# Patient Record
Sex: FEMALE | Race: WHITE | Employment: FULL TIME | URBAN - METROPOLITAN AREA
[De-identification: names, ages, dates, MRNs, and addresses within clinical notes are randomized per-mention and may not be internally consistent; named-entity substitution may affect disease eponyms.]

---

## 2017-03-27 ENCOUNTER — OFFICE VISIT (OUTPATIENT)
Dept: OBGYN CLINIC | Age: 37
End: 2017-03-27
Payer: COMMERCIAL

## 2017-03-27 ENCOUNTER — HOSPITAL ENCOUNTER (OUTPATIENT)
Age: 37
Setting detail: SPECIMEN
Discharge: HOME | End: 2017-03-27

## 2017-03-27 VITALS
WEIGHT: 116 LBS | BODY MASS INDEX: 19.81 KG/M2 | DIASTOLIC BLOOD PRESSURE: 62 MMHG | HEIGHT: 64 IN | SYSTOLIC BLOOD PRESSURE: 108 MMHG

## 2017-03-27 DIAGNOSIS — Z12.31 VISIT FOR SCREENING MAMMOGRAM: ICD-10-CM

## 2017-03-27 DIAGNOSIS — Z01.419 PAPANICOLAOU SMEAR, AS PART OF ROUTINE GYNECOLOGICAL EXAMINATION: Primary | ICD-10-CM

## 2017-03-27 PROCEDURE — 99395 PREV VISIT EST AGE 18-39: CPT | Performed by: NURSE PRACTITIONER

## 2017-03-27 RX ORDER — AZITHROMYCIN 250 MG/1
TABLET, FILM COATED ORAL
COMMUNITY
Start: 2016-12-29 | End: 2017-03-27 | Stop reason: ALTCHOICE

## 2017-03-27 RX ORDER — SPIRONOLACTONE 50 MG/1
TABLET, FILM COATED ORAL
COMMUNITY
Start: 2017-01-18 | End: 2017-03-27 | Stop reason: DRUGHIGH

## 2017-03-27 ASSESSMENT — ENCOUNTER SYMPTOMS
DIARRHEA: 0
WHEEZING: 0
COLOR CHANGE: 0
CONSTIPATION: 0
PHOTOPHOBIA: 0
BLOOD IN STOOL: 0
COUGH: 0
VOMITING: 0
SHORTNESS OF BREATH: 0
ABDOMINAL DISTENTION: 0
BACK PAIN: 0
NAUSEA: 0
CHEST TIGHTNESS: 0
ABDOMINAL PAIN: 0

## 2017-03-28 LAB
HPV SAMPLE: NORMAL
HPV SOURCE: NORMAL
HPV, GENOTYPE 16: NOT DETECTED
HPV, GENOTYPE 18: NOT DETECTED
HPV, HIGH RISK OTHER: NOT DETECTED
HPV, INTERPRETATION: NORMAL

## 2017-03-29 LAB — CYTOLOGY REPORT: NORMAL

## 2017-04-26 ENCOUNTER — HOSPITAL ENCOUNTER (OUTPATIENT)
Dept: WOMENS IMAGING | Age: 37
Discharge: HOME | End: 2017-04-26
Payer: COMMERCIAL

## 2017-04-26 DIAGNOSIS — Z12.31 VISIT FOR SCREENING MAMMOGRAM: ICD-10-CM

## 2017-04-26 PROCEDURE — G0202 SCR MAMMO BI INCL CAD: HCPCS

## 2018-01-02 ENCOUNTER — HOSPITAL ENCOUNTER (OUTPATIENT)
Age: 38
Setting detail: SPECIMEN
Discharge: HOME | End: 2018-01-02
Payer: COMMERCIAL

## 2018-01-02 ENCOUNTER — OFFICE VISIT (OUTPATIENT)
Dept: FAMILY MEDICINE CLINIC | Age: 38
End: 2018-01-02
Payer: COMMERCIAL

## 2018-01-02 VITALS
DIASTOLIC BLOOD PRESSURE: 82 MMHG | OXYGEN SATURATION: 98 % | SYSTOLIC BLOOD PRESSURE: 142 MMHG | HEIGHT: 64 IN | WEIGHT: 116 LBS | TEMPERATURE: 98 F | HEART RATE: 103 BPM | RESPIRATION RATE: 16 BRPM | BODY MASS INDEX: 19.81 KG/M2

## 2018-01-02 DIAGNOSIS — N39.0 URINARY TRACT INFECTION WITHOUT HEMATURIA, SITE UNSPECIFIED: Primary | ICD-10-CM

## 2018-01-02 DIAGNOSIS — N39.0 URINARY TRACT INFECTION WITHOUT HEMATURIA, SITE UNSPECIFIED: ICD-10-CM

## 2018-01-02 LAB
BILIRUBIN, POC: NORMAL
BLOOD URINE, POC: NEGATIVE
CLARITY, POC: CLEAR
COLOR, POC: YELLOW
CONTROL: POSITIVE
GLUCOSE URINE, POC: NEGATIVE
KETONES, POC: NEGATIVE
LEUKOCYTE EST, POC: NEGATIVE
NITRITE, POC: POSITIVE
PH, POC: 6.5
PREGNANCY TEST URINE, POC: NEGATIVE
PROTEIN, POC: NEGATIVE
SPECIFIC GRAVITY, POC: 1.01
UROBILINOGEN, POC: NORMAL

## 2018-01-02 PROCEDURE — 81025 URINE PREGNANCY TEST: CPT | Performed by: FAMILY MEDICINE

## 2018-01-02 PROCEDURE — 81002 URINALYSIS NONAUTO W/O SCOPE: CPT | Performed by: FAMILY MEDICINE

## 2018-01-02 PROCEDURE — 99213 OFFICE O/P EST LOW 20 MIN: CPT | Performed by: FAMILY MEDICINE

## 2018-01-02 RX ORDER — NITROFURANTOIN 25; 75 MG/1; MG/1
100 CAPSULE ORAL 2 TIMES DAILY
Qty: 20 CAPSULE | Refills: 0 | Status: SHIPPED | OUTPATIENT
Start: 2018-01-02 | End: 2018-01-12

## 2018-01-02 RX ORDER — SPIRONOLACTONE 50 MG/1
TABLET, FILM COATED ORAL
Refills: 0 | COMMUNITY
Start: 2017-12-06 | End: 2018-01-02 | Stop reason: ALTCHOICE

## 2018-01-02 ASSESSMENT — ENCOUNTER SYMPTOMS
ABDOMINAL PAIN: 0
VOMITING: 0
NAUSEA: 1
RHINORRHEA: 0
EYE DISCHARGE: 0
COUGH: 1
SINUS PRESSURE: 0
WHEEZING: 0
SORE THROAT: 0
DIARRHEA: 0
SHORTNESS OF BREATH: 0
CHEST TIGHTNESS: 0
EYE ITCHING: 0
TROUBLE SWALLOWING: 0
VOICE CHANGE: 0
EYE REDNESS: 0
BACK PAIN: 0
CONSTIPATION: 0

## 2018-01-02 NOTE — PATIENT INSTRUCTIONS
Patient Education        Urinary Tract Infection in Women: Care Instructions  Your Care Instructions    A urinary tract infection, or UTI, is a general term for an infection anywhere between the kidneys and the urethra (where urine comes out). Most UTIs are bladder infections. They often cause pain or burning when you urinate. UTIs are caused by bacteria and can be cured with antibiotics. Be sure to complete your treatment so that the infection goes away. Follow-up care is a key part of your treatment and safety. Be sure to make and go to all appointments, and call your doctor if you are having problems. It's also a good idea to know your test results and keep a list of the medicines you take. How can you care for yourself at home? · Take your antibiotics as directed. Do not stop taking them just because you feel better. You need to take the full course of antibiotics. · Drink extra water and other fluids for the next day or two. This may help wash out the bacteria that are causing the infection. (If you have kidney, heart, or liver disease and have to limit fluids, talk with your doctor before you increase your fluid intake.)  · Avoid drinks that are carbonated or have caffeine. They can irritate the bladder. · Urinate often. Try to empty your bladder each time. · To relieve pain, take a hot bath or lay a heating pad set on low over your lower belly or genital area. Never go to sleep with a heating pad in place. To prevent UTIs  · Drink plenty of water each day. This helps you urinate often, which clears bacteria from your system. (If you have kidney, heart, or liver disease and have to limit fluids, talk with your doctor before you increase your fluid intake.)  · Urinate when you need to. · Urinate right after you have sex. · Change sanitary pads often. · Avoid douches, bubble baths, feminine hygiene sprays, and other feminine hygiene products that have deodorants.   · After going to the bathroom, wipe from front to back. When should you call for help? Call your doctor now or seek immediate medical care if:  ? · Symptoms such as fever, chills, nausea, or vomiting get worse or appear for the first time. ? · You have new pain in your back just below your rib cage. This is called flank pain. ? · There is new blood or pus in your urine. ? · You have any problems with your antibiotic medicine. ? Watch closely for changes in your health, and be sure to contact your doctor if:  ? · You are not getting better after taking an antibiotic for 2 days. ? · Your symptoms go away but then come back. Where can you learn more? Go to https://Pipeline Micro.Eastbeam. org and sign in to your [employer] account. Enter U659 in the [employer] box to learn more about \"Urinary Tract Infection in Women: Care Instructions. \"     If you do not have an account, please click on the \"Sign Up Now\" link. Current as of: May 12, 2017  Content Version: 11.4  © 0876-4046 Healthwise, Incorporated. Care instructions adapted under license by [facility] ([facility]). If you have questions about a medical condition or this instruction, always ask your healthcare professional. [place] [address] any warranty or liability for your use of this information. Please call for results.

## 2018-01-03 LAB
-: NORMAL
AMORPHOUS: NORMAL
BACTERIA: NORMAL
BILIRUBIN URINE: NEGATIVE
CASTS UA: NORMAL /LPF (ref 0–8)
COLOR: ABNORMAL
COMMENT UA: ABNORMAL
CRYSTALS, UA: NORMAL /HPF
CULTURE: NO GROWTH
CULTURE: NORMAL
EPITHELIAL CELLS UA: NORMAL /HPF (ref 0–5)
GLUCOSE URINE: NEGATIVE
KETONES, URINE: NEGATIVE
LEUKOCYTE ESTERASE, URINE: NEGATIVE
Lab: NORMAL
MUCUS: NORMAL
NITRITE, URINE: POSITIVE
OTHER OBSERVATIONS UA: NORMAL
PH UA: 6.5 (ref 5–8)
PROTEIN UA: NEGATIVE
RBC UA: NORMAL /HPF (ref 0–4)
RENAL EPITHELIAL, UA: NORMAL /HPF
SPECIFIC GRAVITY UA: 1 (ref 1–1.03)
SPECIMEN DESCRIPTION: NORMAL
STATUS: NORMAL
TRICHOMONAS: NORMAL
TURBIDITY: CLEAR
URINE HGB: NEGATIVE
UROBILINOGEN, URINE: NORMAL
WBC UA: NORMAL /HPF (ref 0–5)
YEAST: NORMAL

## 2018-01-04 ENCOUNTER — TELEPHONE (OUTPATIENT)
Dept: FAMILY MEDICINE CLINIC | Age: 38
End: 2018-01-04

## 2018-04-03 ENCOUNTER — HOSPITAL ENCOUNTER (OUTPATIENT)
Age: 38
Setting detail: SPECIMEN
Discharge: HOME | End: 2018-04-03
Payer: COMMERCIAL

## 2018-04-03 ENCOUNTER — OFFICE VISIT (OUTPATIENT)
Dept: OBGYN CLINIC | Age: 38
End: 2018-04-03
Payer: COMMERCIAL

## 2018-04-03 VITALS — SYSTOLIC BLOOD PRESSURE: 122 MMHG | DIASTOLIC BLOOD PRESSURE: 72 MMHG | WEIGHT: 117 LBS | BODY MASS INDEX: 20.08 KG/M2

## 2018-04-03 DIAGNOSIS — Z12.31 VISIT FOR SCREENING MAMMOGRAM: ICD-10-CM

## 2018-04-03 DIAGNOSIS — Z01.419 PAPANICOLAOU SMEAR, AS PART OF ROUTINE GYNECOLOGICAL EXAMINATION: Primary | ICD-10-CM

## 2018-04-03 PROCEDURE — 99395 PREV VISIT EST AGE 18-39: CPT | Performed by: NURSE PRACTITIONER

## 2018-04-03 ASSESSMENT — ENCOUNTER SYMPTOMS
VOMITING: 0
COUGH: 0
WHEEZING: 0
VOICE CHANGE: 0
SHORTNESS OF BREATH: 0
COLOR CHANGE: 0
TROUBLE SWALLOWING: 0
DIARRHEA: 0
SORE THROAT: 0
PHOTOPHOBIA: 0
BACK PAIN: 0
STRIDOR: 0
NAUSEA: 0
ABDOMINAL DISTENTION: 0
CONSTIPATION: 0
ABDOMINAL PAIN: 0

## 2018-04-17 LAB — CYTOLOGY REPORT: NORMAL

## 2018-05-08 ENCOUNTER — HOSPITAL ENCOUNTER (OUTPATIENT)
Dept: WOMENS IMAGING | Age: 38
Discharge: HOME | End: 2018-05-10
Payer: COMMERCIAL

## 2018-05-08 DIAGNOSIS — Z12.31 VISIT FOR SCREENING MAMMOGRAM: ICD-10-CM

## 2018-05-08 PROCEDURE — 77063 BREAST TOMOSYNTHESIS BI: CPT

## 2018-06-03 ENCOUNTER — HOSPITAL ENCOUNTER (OUTPATIENT)
Age: 38
Setting detail: SPECIMEN
Discharge: HOME | End: 2018-06-03
Payer: COMMERCIAL

## 2018-06-03 ENCOUNTER — OFFICE VISIT (OUTPATIENT)
Dept: FAMILY MEDICINE CLINIC | Age: 38
End: 2018-06-03
Payer: COMMERCIAL

## 2018-06-03 VITALS
BODY MASS INDEX: 20.32 KG/M2 | HEIGHT: 64 IN | RESPIRATION RATE: 16 BRPM | TEMPERATURE: 98.1 F | DIASTOLIC BLOOD PRESSURE: 76 MMHG | HEART RATE: 82 BPM | SYSTOLIC BLOOD PRESSURE: 122 MMHG | WEIGHT: 119 LBS | OXYGEN SATURATION: 98 %

## 2018-06-03 DIAGNOSIS — N39.0 URINARY TRACT INFECTION WITHOUT HEMATURIA, SITE UNSPECIFIED: Primary | ICD-10-CM

## 2018-06-03 DIAGNOSIS — R30.0 DYSURIA: ICD-10-CM

## 2018-06-03 LAB
BILIRUBIN, POC: NEGATIVE
BLOOD URINE, POC: ABNORMAL
CLARITY, POC: CLEAR
COLOR, POC: YELLOW
GLUCOSE URINE, POC: NEGATIVE
KETONES, POC: NEGATIVE
LEUKOCYTE EST, POC: ABNORMAL
NITRITE, POC: NEGATIVE
PH, POC: 7
PROTEIN, POC: NEGATIVE
SPECIFIC GRAVITY, POC: 1.01
UROBILINOGEN, POC: ABNORMAL

## 2018-06-03 PROCEDURE — 1036F TOBACCO NON-USER: CPT | Performed by: INTERNAL MEDICINE

## 2018-06-03 PROCEDURE — 87186 SC STD MICRODIL/AGAR DIL: CPT

## 2018-06-03 PROCEDURE — 81003 URINALYSIS AUTO W/O SCOPE: CPT | Performed by: INTERNAL MEDICINE

## 2018-06-03 PROCEDURE — G8427 DOCREV CUR MEDS BY ELIG CLIN: HCPCS | Performed by: INTERNAL MEDICINE

## 2018-06-03 PROCEDURE — G8420 CALC BMI NORM PARAMETERS: HCPCS | Performed by: INTERNAL MEDICINE

## 2018-06-03 PROCEDURE — 87086 URINE CULTURE/COLONY COUNT: CPT

## 2018-06-03 PROCEDURE — 86403 PARTICLE AGGLUT ANTBDY SCRN: CPT

## 2018-06-03 PROCEDURE — 99213 OFFICE O/P EST LOW 20 MIN: CPT | Performed by: INTERNAL MEDICINE

## 2018-06-03 PROCEDURE — 87077 CULTURE AEROBIC IDENTIFY: CPT

## 2018-06-03 RX ORDER — PHENAZOPYRIDINE HYDROCHLORIDE 100 MG/1
100 TABLET, FILM COATED ORAL
COMMUNITY
Start: 2018-05-11 | End: 2019-06-11

## 2018-06-03 RX ORDER — NITROFURANTOIN 25; 75 MG/1; MG/1
100 CAPSULE ORAL 2 TIMES DAILY
Qty: 14 CAPSULE | Refills: 0 | Status: SHIPPED | OUTPATIENT
Start: 2018-06-03 | End: 2018-06-10

## 2018-06-03 RX ORDER — SPIRONOLACTONE 50 MG/1
TABLET, FILM COATED ORAL
Refills: 0 | COMMUNITY
Start: 2018-05-10 | End: 2018-06-03 | Stop reason: SDUPTHER

## 2018-06-03 RX ORDER — TRETINOIN 0.25 MG/G
GEL TOPICAL
Refills: 1 | COMMUNITY
Start: 2018-05-18 | End: 2019-06-11

## 2018-06-03 ASSESSMENT — PATIENT HEALTH QUESTIONNAIRE - PHQ9
1. LITTLE INTEREST OR PLEASURE IN DOING THINGS: 0
2. FEELING DOWN, DEPRESSED OR HOPELESS: 0
SUM OF ALL RESPONSES TO PHQ QUESTIONS 1-9: 0
SUM OF ALL RESPONSES TO PHQ9 QUESTIONS 1 & 2: 0

## 2018-06-03 ASSESSMENT — ENCOUNTER SYMPTOMS
COUGH: 0
SHORTNESS OF BREATH: 0

## 2018-06-05 LAB
CULTURE: ABNORMAL
Lab: ABNORMAL
ORGANISM: ABNORMAL
SPECIMEN DESCRIPTION: ABNORMAL
SPECIMEN DESCRIPTION: ABNORMAL
STATUS: ABNORMAL

## 2019-06-11 ENCOUNTER — HOSPITAL ENCOUNTER (OUTPATIENT)
Age: 39
Setting detail: SPECIMEN
Discharge: HOME | End: 2019-06-11
Payer: COMMERCIAL

## 2019-06-11 ENCOUNTER — OFFICE VISIT (OUTPATIENT)
Dept: OBGYN CLINIC | Age: 39
End: 2019-06-11
Payer: COMMERCIAL

## 2019-06-11 VITALS — SYSTOLIC BLOOD PRESSURE: 116 MMHG | BODY MASS INDEX: 19.91 KG/M2 | DIASTOLIC BLOOD PRESSURE: 74 MMHG | WEIGHT: 116 LBS

## 2019-06-11 DIAGNOSIS — Z01.419 VISIT FOR GYNECOLOGIC EXAMINATION: Primary | ICD-10-CM

## 2019-06-11 DIAGNOSIS — Z12.31 VISIT FOR SCREENING MAMMOGRAM: ICD-10-CM

## 2019-06-11 PROCEDURE — 99396 PREV VISIT EST AGE 40-64: CPT | Performed by: NURSE PRACTITIONER

## 2019-06-11 ASSESSMENT — ENCOUNTER SYMPTOMS
CONSTIPATION: 0
VOMITING: 0
COLOR CHANGE: 0
NAUSEA: 0
SHORTNESS OF BREATH: 0
RHINORRHEA: 0
BACK PAIN: 0
ABDOMINAL PAIN: 0
COUGH: 0
DIARRHEA: 0

## 2019-06-11 NOTE — PATIENT INSTRUCTIONS
Patient Education      Patient Education        Learning About Breast Cancer Screening  What is breast cancer screening? Breast cancer occurs when cells that are not normal grow in one or both of your breasts. Screening tests can help find breast cancer early. Cancer is easier to treat when it's found early. Having concerns about breast cancer is common. That's why it's important to talk with your doctor about when to start and how often to get screened for breast cancer. How is breast cancer screening done? Several screening tests can be used to check for breast cancer. · Mammograms check for signs of cancer using X-rays. They can show tumors that are too small for you or your doctor to feel. During a mammogram, a machine squeezes your breasts to make them flatter and easier to X-ray. At least two pictures are taken of each breast. One is taken from the top and one from the side. · 3-D mammograms are also called digital breast tomosynthesis. Your breast is positioned on a flat plate. A top plate is pressed against your breast to keep it in position. The X-ray arm then moves in an arc above the breast and takes many pictures. A computer uses these X-rays to create a three-dimensional image. · Clinical breast exams are a doctor's exam. Your doctor carefully feels your breasts and under your arms to check for lumps or other changes. After the screening, your doctor will tell you the results. You will also be told if you need any follow-up tests. When should you get screened? Talk with your doctor about when you should start being tested for breast cancer. How often you get tested and the kind of tests you get will depend on your age and your risk. The guidelines that follow are for women who have an average risk for breast cancer.  If you have a higher risk for breast cancer, such as having a family history of breast cancer in multiple relatives or at a young age, your doctor may recommend different screening for you. · Ages 21 to 44: Some experts recommend that women have a clinical breast exam every 3 years, starting at age 21. Ask your doctor how often you should have this test. If you have a high risk for breast cancer, talk with your doctor about when to start yearly mammograms and other screening tests. · Ages 36 and older: Talk with your doctor about how often you should have mammograms and clinical breast exams. What is your risk for breast cancer? If you don't already know your risk of breast cancer, you can ask your doctor about it. You can also look it up at www.cancer.gov/bcrisktool/. If your doctor says that you have a high or very high risk, ask about ways to reduce your risk. These could include getting extra screening, taking medicine, or having surgery. If you have a strong family history of breast cancer, ask your doctor about genetic testing. What steps can you take to stay healthy? Some things that increase your risk of breast cancer, such as your age and being female, cannot be controlled. But you can do some things to stay as healthy as you can. · Learn what your breasts normally look and feel like. If you notice any changes, tell your doctor. · Drink alcohol wisely. Your risk goes up the more you drink. For the best health, women should have no more than 1 drink a day or 7 drinks a week. · If you smoke, quit. When you quit smoking, you lower your chances of getting many types of cancer. You can also do your best to eat well, be active, and stay at a healthy weight. Eating healthy foods and being active every day, as well as staying at a healthy weight, may help prevent cancer. Where can you learn more? Go to https://adriano.ChannelBreeze. org and sign in to your [employer] account. Enter P571 in the [employer] box to learn more about \"Learning About Breast Cancer Screening. \"     If you do not have an account, please click on the \"Sign Up Now\" link.   Current as of: December 19, 2018  Content Version: 12.0  © 9555-8669 Healthwise, [employer]. Care instructions adapted under license by [facility] ([facility]). If you have questions about a medical condition or this instruction, always ask your healthcare professional. [address] any warranty or liability for your use of this information. Pap Test: Care Instructions  Your Care Instructions    The Pap test (also called a Pap smear) is a screening test for cancer of the cervix, which is the lower part of the uterus that opens into the vagina. The test can help your doctor find early changes in the cells that could lead to cancer. The sample of cells taken during your test has been sent to a lab so that an expert can look at the cells. It usually takes a week or two to get the results back. Follow-up care is a key part of your treatment and safety. Be sure to make and go to all appointments, and call your doctor if you are having problems. It's also a good idea to know your test results and keep a list of the medicines you take. What do the results mean? · A normal result means that the test did not find any abnormal cells in the sample. · An abnormal result can mean many things. Most of these are not cancer. The results of your test may be abnormal because:  ? You have an infection of the vagina or cervix, such as a yeast infection. ? You have an IUD (intrauterine device for birth control). ? You have low estrogen levels after menopause that are causing the cells to change. ? You have cell changes that may be a sign of precancer or cancer. The results are ranked based on how serious the changes might be. There are many other reasons why you might not get a normal result. If the results were abnormal, you may need to get another test within a few weeks or months.  If the results show changes that could be a sign of cancer, you may need a test called a colposcopy, which provides a more complete

## 2019-06-11 NOTE — PROGRESS NOTES
Mallory Garcia is a 35 y.o.  here for her annual exam.  The patient was seen and examined. The patients past medical, surgical, social and family history were reviewed. Current medications and allergies were reviewed, and documented in the chart. Has had 3 children- 1 girl and 2 boys. She is . 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 Exercise Yes  Diet Yes  Tobacco abuse No    Last PAP: 2018- normal, hx of abnormal PAPyes- years ago she states had repeat though and normal.   Family hx uterine or ovarian cancer-denies  Last mammogram- may 2018-normal, Family hx of breast cancer -denies   family hx colon cancer -denies        Sexually active: yes - with , multiple partners: No, Dyspareunia: No, Vaginal discharge: no,  UTI symptoms: no (does see urology for hx frequent UTIs has atb to use if needed, no current symptoms), voiding difficulties: no, bowels regular:Yes bloating:no      Menstrual history:  Menarche age- 14, cycle every  28 days,  lasts 5 days. Birth control:vasectomy    OB History    Para Term  AB Living   3 3 3     3   SAB TAB Ectopic Molar Multiple Live Births             1      # Outcome Date GA Lbr Ollie/2nd Weight Sex Delivery Anes PTL Lv   3 Term            2 Term      CS-LTranv   DEEPAK   1 Term                Vitals:    19 1022   BP: 116/74   Site: Right Upper Arm   Position: Sitting   Cuff Size: Medium Adult   Weight: 116 lb (52.6 kg)       Wt Readings from Last 3 Encounters:   19 116 lb (52.6 kg)   18 119 lb (54 kg)   18 117 lb (53.1 kg)     Past Medical History:   Diagnosis Date    MDRO (multiple drug resistant organisms) resistance 2014    E.  Coli urine                                                                   Past Surgical History:   Procedure Laterality Date     SECTION      VARICOSE VEIN SURGERY Bilateral      Family History   Problem Relation Age of Onset    Heart Attack Paternal Grandfather     Cancer Paternal Grandmother         lymphoma    High Blood Pressure Maternal Grandmother     Heart Disease Maternal Grandfather     Diabetes Mother     Heart Disease Mother     No Known Problems Father     No Known Problems Brother      Social History     Tobacco Use   Smoking Status Never Smoker   Smokeless Tobacco Never Used     Social History     Substance and Sexual Activity   Alcohol Use No        Social History     Tobacco History     Smoking Status  Never Smoker    Smokeless Tobacco Use  Never Used          Alcohol History     Alcohol Use Status  No          Drug Use     Drug Use Status  No          Sexual Activity     Sexually Active  Yes Partners  Male              Allergies   Allergen Reactions    Bactrim     Pcn [Penicillins]     Sulfa Antibiotics      Current Outpatient Medications   Medication Sig Dispense Refill    spironolactone (ALDACTONE) 100 MG tablet Take 100 mg by mouth daily. No current facility-administered medications for this visit. Subjective:     Review of Systems   Constitutional: Negative for chills, fatigue, fever and unexpected weight change. HENT: Negative for congestion and rhinorrhea. Eyes: Negative for visual disturbance. Respiratory: Negative for cough and shortness of breath. Cardiovascular: Negative for chest pain, palpitations and leg swelling. Gastrointestinal: Negative for abdominal pain, constipation, diarrhea, nausea and vomiting. Endocrine: Negative for cold intolerance, heat intolerance, polydipsia and polyuria. Genitourinary: Negative for dyspareunia, dysuria, flank pain, menstrual problem, pelvic pain, vaginal bleeding, vaginal discharge and vaginal pain. Musculoskeletal: Negative for back pain and myalgias. Skin: Negative for color change and rash. Neurological: Negative for dizziness, light-headedness and headaches. Hematological: Negative for adenopathy. Does not bruise/bleed easily.    Psychiatric/Behavioral: Negative for self-injury

## 2019-06-17 LAB
CYTOLOGY REPORT: NORMAL
HPV SAMPLE: NORMAL
HPV, GENOTYPE 16: NOT DETECTED
HPV, GENOTYPE 18: NOT DETECTED
HPV, HIGH RISK OTHER: NOT DETECTED
HPV, INTERPRETATION: NORMAL
SPECIMEN DESCRIPTION: NORMAL

## 2019-07-09 ENCOUNTER — HOSPITAL ENCOUNTER (OUTPATIENT)
Dept: WOMENS IMAGING | Age: 39
Discharge: HOME | End: 2019-07-11
Payer: COMMERCIAL

## 2019-07-09 DIAGNOSIS — Z12.31 VISIT FOR SCREENING MAMMOGRAM: ICD-10-CM

## 2019-07-09 PROCEDURE — 77063 BREAST TOMOSYNTHESIS BI: CPT

## 2020-02-03 ENCOUNTER — HOSPITAL ENCOUNTER (OUTPATIENT)
Age: 40
Setting detail: SPECIMEN
Discharge: HOME | End: 2020-02-03
Payer: COMMERCIAL

## 2020-02-03 ENCOUNTER — OFFICE VISIT (OUTPATIENT)
Dept: OBGYN CLINIC | Age: 40
End: 2020-02-03
Payer: COMMERCIAL

## 2020-02-03 VITALS
HEIGHT: 62 IN | SYSTOLIC BLOOD PRESSURE: 120 MMHG | DIASTOLIC BLOOD PRESSURE: 64 MMHG | BODY MASS INDEX: 21.71 KG/M2 | WEIGHT: 118 LBS

## 2020-02-03 PROCEDURE — 99213 OFFICE O/P EST LOW 20 MIN: CPT | Performed by: OBSTETRICS & GYNECOLOGY

## 2020-02-03 RX ORDER — FLUCONAZOLE 150 MG/1
150 TABLET ORAL
Qty: 3 TABLET | Refills: 0 | Status: SHIPPED | OUTPATIENT
Start: 2020-02-03 | End: 2020-02-10

## 2020-02-03 NOTE — PROGRESS NOTES
Patrick Maria  2/3/2020    YOB: 1980      The patient was seen today. She is here regarding vaginal discharge and irritation. Her bowels are regular and she is voiding without difficulty. HPI:  Patrick Maria is a 38 y.o. female      Pt. Has had 5 days of irritation and discharge. Slight odor. She states it has gotten worse and now is even more irritated. OB History    Para Term  AB Living   3 3 3 0 0 3   SAB TAB Ectopic Molar Multiple Live Births   0 0 0 0 0 1      # Outcome Date GA Lbr Ollie/2nd Weight Sex Delivery Anes PTL Lv   3 Term            2 Term      CS-LTranv   DEEPAK   1 Term                Past Medical History:   Diagnosis Date    MDRO (multiple drug resistant organisms) resistance 2014    E.  Coli urine       Past Surgical History:   Procedure Laterality Date     SECTION      VARICOSE VEIN SURGERY Bilateral 2015       Family History   Problem Relation Age of Onset    Heart Attack Paternal Grandfather     Cancer Paternal Grandmother         lymphoma    High Blood Pressure Maternal Grandmother     Heart Disease Maternal Grandfather     Diabetes Mother     Heart Disease Mother     No Known Problems Father     No Known Problems Brother        Social History     Socioeconomic History    Marital status:      Spouse name: Not on file    Number of children: Not on file    Years of education: Not on file    Highest education level: Not on file   Occupational History    Not on file   Social Needs    Financial resource strain: Not on file    Food insecurity:     Worry: Not on file     Inability: Not on file    Transportation needs:     Medical: Not on file     Non-medical: Not on file   Tobacco Use    Smoking status: Never Smoker    Smokeless tobacco: Never Used   Substance and Sexual Activity    Alcohol use: No    Drug use: No    Sexual activity: Yes     Partners: Male   Lifestyle    Physical activity:     Days per week: Not on file     Minutes per session: Not on file    Stress: Not on file   Relationships    Social connections:     Talks on phone: Not on file     Gets together: Not on file     Attends Protestant service: Not on file     Active member of club or organization: Not on file     Attends meetings of clubs or organizations: Not on file     Relationship status: Not on file    Intimate partner violence:     Fear of current or ex partner: Not on file     Emotionally abused: Not on file     Physically abused: Not on file     Forced sexual activity: Not on file   Other Topics Concern    Not on file   Social History Narrative    Not on file         MEDICATIONS:  Current Outpatient Medications   Medication Sig Dispense Refill    spironolactone (ALDACTONE) 100 MG tablet Take 100 mg by mouth daily. No current facility-administered medications for this visit. ALLERGIES:  Allergies as of 02/03/2020 - Review Complete 02/03/2020   Allergen Reaction Noted    Bactrim  10/15/2012    Pcn [penicillins]  10/15/2012    Sulfa antibiotics  01/12/2017         REVIEW OF SYSTEMS:       A minimum of an eleven point review of systems was completed. Review Of Systems (11 point):  Constitutional: No fever, chills or malaise; No weight change or fatigue  Head and Eyes: No vision, Headache, Dizziness or trauma in last 12 months  ENT ROS: No hearing, Tinnitis, sinus or taste problems  Hematological and Lymphatic ROS:No Lymphoma, Von Willebrand's, Hemophillia or Bleeding History  Psych ROS: No Depression, Homicidal thoughts,suicidal thoughts, or anxiety  Breast ROS: No prior breast abnormalities or lumps  Respiratory ROS: No SOB, Pneumoniae,Cough, or Pulmonary Embolism History  Cardiovascular ROS: No Chest Pain with Exertion, Palpitations, Syncope, Edema, Arrhythmia  Gastrointestinal ROS: No Indigestion, Heartburn, Nausea, vomiting, Diarrhea, Constipation,or Bowel Changes;  No Bloody Stools or melena  Genito-Urinary ROS: Out**  ey/6/17/2019          Procedure/Addendum  HPV Procedure Report     Date Ordered:     6/12/ 2019     Status:  Signed Out       Date Complete:     6/12/2019     By: Silver HERRERA(ASCP)       Date Reported:     6/17/2019       INTERPRETATION  Roche HPV DNA High Risk                                  HPV Sample               Thin Prep                    (Ref Range)  HPV Type 16               Not Detected                    (Not  Detected)  HPV Type 18               Not Detected                    (Not  Detected)  Other High Risk HPV     Not Detected                    (Not Detected)       This test amplifies and detects DNA of 14 high-risk HPV types  associated with cervical cancer and its precursor lesions (HPV types  16, 18, 31, 33, 35, 39, 45, 51, 52, 56, 58, 59, 66, and 68). Sensitivity may be affected by specimen collection methods, stage of  infection, and the presence of interfering substances. Results should  be interpreted in conjunction with other available laboratory and  clinical data. A negative high-risk HPV result does not exclude the  possibility of future cytol ogic HSIL or underlying CIN2-3 or cancer. This test is intended for medical purposes only and is not valid for  the evaluation of suspected sexual abuse or for other forensic  purposes. Human papillomavirus (HPV) DNA probe thin prep high risk   Result Value Ref Range    Specimen Description . CERVIX     HPV Sample . THIN PREP     HPV, Genotype 16 Not Detected Not Detected    HPV, Genotype 18 Not Detected Not Detected    HPV, High Risk Other Not Detected Not Detected    HPV, Interpretation               Assessment:   Diagnosis Orders   1. Vaginal discharge  VAGINITIS DNA PROBE   2. Vaginal odor  VAGINITIS DNA PROBE   3.  Vaginal itching  VAGINITIS DNA PROBE     Patient Active Problem List    Diagnosis Date Noted    Breast lump 05/13/2013       PLAN:  Return if symptoms worsen or fail to improve, for annual.  Cultures

## 2020-02-04 ENCOUNTER — TELEPHONE (OUTPATIENT)
Dept: OBGYN CLINIC | Age: 40
End: 2020-02-04

## 2020-02-04 LAB
DIRECT EXAM: ABNORMAL
Lab: ABNORMAL
SPECIMEN DESCRIPTION: ABNORMAL

## 2020-02-04 NOTE — TELEPHONE ENCOUNTER
----- Message from Alesia Johnston DO sent at 2/4/2020 11:46 AM EST -----  Recommend Solosec x1, continue with plan for diflucan (ordered at visit) and make sure and continue probiotic. Thank you.

## 2020-08-06 ENCOUNTER — HOSPITAL ENCOUNTER (OUTPATIENT)
Age: 40
Setting detail: SPECIMEN
Discharge: HOME | End: 2020-08-06
Payer: COMMERCIAL

## 2020-08-06 ENCOUNTER — OFFICE VISIT (OUTPATIENT)
Dept: OBGYN CLINIC | Age: 40
End: 2020-08-06
Payer: COMMERCIAL

## 2020-08-06 VITALS
BODY MASS INDEX: 21.03 KG/M2 | TEMPERATURE: 97.7 F | WEIGHT: 115 LBS | DIASTOLIC BLOOD PRESSURE: 62 MMHG | SYSTOLIC BLOOD PRESSURE: 110 MMHG

## 2020-08-06 PROCEDURE — 99396 PREV VISIT EST AGE 40-64: CPT | Performed by: NURSE PRACTITIONER

## 2020-08-06 ASSESSMENT — ENCOUNTER SYMPTOMS
DIARRHEA: 0
COLOR CHANGE: 0
RHINORRHEA: 0
ABDOMINAL PAIN: 1
VOMITING: 0
CONSTIPATION: 0
SHORTNESS OF BREATH: 0
COUGH: 0
NAUSEA: 1
BACK PAIN: 0

## 2020-08-06 NOTE — PROGRESS NOTES
Vidhi Rodriguez is a 38 y.o.  here for her annual exam.  The patient was seen and examined. The patients past medical, surgical, social and family history were reviewed. Current medications and allergies were reviewed, and documented in the chart. Has had 3 children- 1 girl and 2 boys. She is . 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 Exercise Yes  Diet Yes  Tobacco abuse No    Last PAP: 2019- negative hx of abnormal PAP yes - years ago, states repeat was normal  Family hx uterine or ovarian cancer-denies  Last mammogram- 2019- negative, Family hx of breast cancer -denies  family hx colon cancer -denies        Sexually active: yes - with , multiple partners: No, Dyspareunia: No, Vaginal discharge: no,  UTI symptoms: no, voiding difficulties: no, bowels regular:Yes bloating:no      Menstrual history:  Menarche age- 14, cycle every month   lasts 5-7 days. Birth control: vasectomy  LMP: 19    OB History    Para Term  AB Living   3 3 3     3   SAB TAB Ectopic Molar Multiple Live Births             1      # Outcome Date GA Lbr Ollie/2nd Weight Sex Delivery Anes PTL Lv   3 Term            2 Term      CS-LTranv   DEEPAK   1 Term                Vitals:    20 1322   BP: 110/62   Site: Right Upper Arm   Position: Sitting   Cuff Size: Medium Adult   Temp: 97.7 °F (36.5 °C)   TempSrc: Temporal   Weight: 115 lb (52.2 kg)       Wt Readings from Last 3 Encounters:   20 115 lb (52.2 kg)   20 118 lb (53.5 kg)   19 116 lb (52.6 kg)     Past Medical History:   Diagnosis Date    MDRO (multiple drug resistant organisms) resistance 2014    E.  Coli urine                                                                   Past Surgical History:   Procedure Laterality Date     SECTION      VARICOSE VEIN SURGERY Bilateral      Family History   Problem Relation Age of Onset    Heart Attack Paternal Grandfather     Cancer Paternal Grandmother         lymphoma    High Blood Pressure Maternal Grandmother     Heart Disease Maternal Grandfather     Diabetes Mother     Heart Disease Mother     No Known Problems Father     No Known Problems Brother      Social History     Tobacco Use   Smoking Status Never Smoker   Smokeless Tobacco Never Used     Social History     Substance and Sexual Activity   Alcohol Use No        Social History     Tobacco History     Smoking Status  Never Smoker    Smokeless Tobacco Use  Never Used          Alcohol History     Alcohol Use Status  No          Drug Use     Drug Use Status  No          Sexual Activity     Sexually Active  Yes Partners  Male              Allergies   Allergen Reactions    Bactrim     Pcn [Penicillins]     Sulfa Antibiotics     Solosec [Secnidazole] Nausea And Vomiting     Made her very sick to her stomach     Current Outpatient Medications   Medication Sig Dispense Refill    spironolactone (ALDACTONE) 100 MG tablet Take 100 mg by mouth daily. No current facility-administered medications for this visit. Subjective:     Review of Systems   Constitutional: Negative for chills, fatigue, fever and unexpected weight change. HENT: Negative for congestion and rhinorrhea. Eyes: Negative for visual disturbance. Respiratory: Negative for cough and shortness of breath. Cardiovascular: Negative for chest pain, palpitations and leg swelling. Gastrointestinal: Positive for abdominal pain (RUQ) and nausea. Negative for constipation, diarrhea and vomiting. Endocrine: Negative for cold intolerance, heat intolerance, polydipsia and polyuria. Genitourinary: Negative for dyspareunia, dysuria, flank pain, menstrual problem, pelvic pain, vaginal bleeding, vaginal discharge and vaginal pain. Musculoskeletal: Negative for back pain and myalgias. Skin: Negative for color change and rash. Neurological: Negative for dizziness, light-headedness and headaches. Hematological: Negative for adenopathy.  Does not bruise/bleed easily. Psychiatric/Behavioral: Negative for self-injury and suicidal ideas. Objective:     Physical Exam  Vitals signs and nursing note reviewed. Constitutional:       General: She is not in acute distress. Appearance: She is well-developed. She is not diaphoretic. HENT:      Head: Normocephalic and atraumatic. Right Ear: External ear normal.      Left Ear: External ear normal.      Nose: Nose normal.   Eyes:      Pupils: Pupils are equal, round, and reactive to light. Neck:      Musculoskeletal: Normal range of motion and neck supple. Thyroid: No thyromegaly. Cardiovascular:      Rate and Rhythm: Normal rate and regular rhythm. Heart sounds: Normal heart sounds. No murmur. No friction rub. No gallop. Comments: No bilateral calf tenderness or swelling  Pulmonary:      Effort: Pulmonary effort is normal. No respiratory distress. Breath sounds: Normal breath sounds. No wheezing. Abdominal:      General: Bowel sounds are normal.      Palpations: Abdomen is soft. Tenderness: There is no abdominal tenderness. Genitourinary:     Comments: Breasts nipples everted, no masses or tenderness, does BSE  Vulva-no lesions  Vagina-pink rugated  Cervix-firm, 2 cm. Nontender, freely movable, no lesions  Uterus-ant. Smooth, firm, nontender, freely movable  Adnexa-no masses or tenderness   Musculoskeletal: Normal range of motion. Lymphadenopathy:      Cervical: No cervical adenopathy. Skin:     General: Skin is warm and dry. Findings: No rash. Neurological:      Mental Status: She is alert and oriented to person, place, and time. Cranial Nerves: No cranial nerve deficit. Deep Tendon Reflexes: Reflexes are normal and symmetric. Psychiatric:         Behavior: Behavior normal.         Thought Content:  Thought content normal.         Judgment: Judgment normal.       /62 (Site: Right Upper Arm, Position: Sitting, Cuff Size: Medium Adult) Temp 97.7 °F (36.5 °C) (Temporal)   Wt 115 lb (52.2 kg)   LMP 07/27/2020 (Approximate)   Breastfeeding No   BMI 21.03 kg/m²     Assessment:       Diagnosis Orders   1. Visit for gynecologic examination  PAP SMEAR   2. Screening mammogram, encounter for  OBEY DIGITAL SCREEN W OR WO CAD BILATERAL       Breast exam completed. Pelvic exam pap smear collected and sent. Cultures sent No    Plan:   Collect pap   BSE reviewed, Mammogram ordered    Cultures declined   She is having intermittent RUQ pain,and noted after eating and nausea- recommend she contact pcp for workup discussed possible gallbladder issue, discussed if severe, persistent pain or vomiting seek emergent care she v/u  And states will call pcp today  Diet & Exercise reviewed with pt. Preventive  Health through PCP   RV prn/annual           Orders Placed This Encounter   Procedures    OBEY DIGITAL SCREEN W OR WO CAD BILATERAL     Standing Status:   Future     Standing Expiration Date:   10/6/2021     Order Specific Question:   Reason for exam:     Answer:   annual screening mammogram    PAP SMEAR     Standing Status:   Future     Standing Expiration Date:   8/6/2021     Order Specific Question:   Collection Type     Answer: Thin Prep     Order Specific Question:   Prior Abnormal Pap Test     Answer:   No     Order Specific Question:   Screening or Diagnostic     Answer:   Screening     Order Specific Question:   HPV Requested? Answer:   Yes     Order Specific Question:   High Risk Patient     Answer:   N/A       Patient given educational materials - seepatient instructions. Discussed use, benefit, and side effects of prescribed medications. All patient questions answered. Pt voiced understanding. Reviewed health maintenance. Instructed to continue current medications, diet and exercise. Patient agreedwith treatment plan. Follow up as directed.       Electronically signed by CAMILLA Tolbert CNP on 8/6/2020at 1:38 PM

## 2020-08-06 NOTE — PATIENT INSTRUCTIONS
Patient Education   Patient Education        Learning About Breast Cancer Screening  What is breast cancer screening? Breast cancer occurs when cells that are not normal grow in one or both of your breasts. Screening tests can help find breast cancer early. Cancer is easier to treat when it's found early. Having concerns about breast cancer is common. That's why it's important to talk with your doctor about when to start and how often to get screened for breast cancer. How is breast cancer screening done? Several screening tests can be used to check for breast cancer. Mammograms. These tests check for signs of cancer using X-rays. They can show tumors that are too small for you or your doctor to feel. During a mammogram, a machine squeezes your breasts to make them flatter and easier to X-ray. At least two pictures are taken of each breast. One is taken from the top and one from the side. 3-D mammograms. These tests are also called digital breast tomosynthesis. Your breast is positioned on a flat plate. A top plate is pressed against your breast to keep it in position. The X-ray arm then moves in an arc above the breast and takes many pictures. A computer uses these X-rays to create a three-dimensional image. Clinical breast exam.   In this exam, your doctor carefully feels your breasts and under your arms to check for lumps or other changes. When should you get screened? Talk with your doctor about when you should start being tested for breast cancer. How often you get tested and the kind of tests you get will depend on your age and your risk. The guidelines that follow are for women who have an average risk for breast cancer. If you have a higher risk, such as having a family history of breast cancer in multiple relatives or at a young age, your doctor may recommend different screening for you.   · Ages 21 to 44: Some experts recommend that women have a clinical breast exam every 1 to 3 years, starting at age 22. Ask your doctor how often you should have this test. If you have a high risk for breast cancer, talk with your doctor about the best schedule and tests for you. · Ages 36 and older: Talk with your doctor about how often you should have mammograms and clinical breast exams. What is your risk for breast cancer? If you don't already know your risk of breast cancer, you can ask your doctor about it. You can also look it up at www.cancer.gov/bcrisktool/. If your doctor says that you have a high or very high risk, ask about ways to reduce your risk. These could include getting extra screening, taking medicine, or having surgery. If you have a strong family history of breast cancer, ask your doctor about genetic testing. What steps can you take to stay healthy? Some things that increase your risk of breast cancer, such as your age and being female, cannot be controlled. But you can do some things to stay as healthy as you can. · Learn what your breasts normally look and feel like. If you notice any changes, tell your doctor. · If you drink alcohol, limit how much you drink. Any amount of alcohol may increase your risk for some types of cancer. · If you smoke, quit. When you quit smoking, you lower your chances of getting many types of cancer. You can also do your best to eat well, be active, and stay at a healthy weight. Eating healthy foods and being active every day, as well as staying at a healthy weight, may help prevent cancer. Where can you learn more? Go to https://Akusticadulce.Cirtas Systems. org and sign in to your Planitax account. Enter S929 in the [facility] box to learn more about \"Learning About Breast Cancer Screening. \"     If you do not have an account, please click on the \"Sign Up Now\" link. Current as of: August 22, 2019               Content Version: 12.5  © 4955-0037 Healthwise, Incorporated. Care instructions adapted under license by [facility] ([facility]).  If you have questions about a medical condition or this instruction, always ask your healthcare professional. [address] any warranty or liability for your use of this information. Pap Test: Care Instructions  Your Care Instructions     The Pap test (also called a Pap smear) is a screening test for cancer of the cervix, which is the lower part of the uterus that opens into the vagina. The test can help your doctor find early changes in the cells that could lead to cancer. The sample of cells taken during your test has been sent to a lab so that an expert can look at the cells. It usually takes a week or two to get the results back. Follow-up care is a key part of your treatment and safety. Be sure to make and go to all appointments, and call your doctor if you are having problems. It's also a good idea to know your test results and keep a list of the medicines you take. What do the results mean? · A normal result means that the test did not find any abnormal cells in the sample. · An abnormal result can mean many things. Most of these are not cancer. The results of your test may be abnormal because:  ? You have an infection of the vagina or cervix, such as a yeast infection. ? You have an IUD (intrauterine device for birth control). ? You have low estrogen levels after menopause that are causing the cells to change. ? You have cell changes that may be a sign of precancer or cancer. The results are ranked based on how serious the changes might be. There are many other reasons why you might not get a normal result. If the results were abnormal, you may need to get another test within a few weeks or months. If the results show changes that could be a sign of cancer, you may need a test called a colposcopy, which provides a more complete view of the cervix. Sometimes the lab cannot use the sample because it does not contain enough cells or was not preserved well.  If so, you may need to have the test again. This is not common, but it does happen from time to time. When should you call for help? Watch closely for changes in your health, and be sure to contact your doctor if:  · You have vaginal bleeding or pain for more than 2 days after the test. It is normal to have a small amount of bleeding for a day or two after the test.  Where can you learn more? Go to https://Fiber OptionspePaladioneweb.PaperShare. org and sign in to your MarketVibe account. Enter I952 in the [employer] box to learn more about \"Pap Test: Care Instructions. \"     If you do not have an account, please click on the \"Sign Up Now\" link. Current as of: August 22, 2019               Content Version: 12.5  © 9097-9778 Healthwise, Incorporated. Care instructions adapted under license by [facility] ([facility]). If you have questions about a medical condition or this instruction, always ask your healthcare professional. [place] [address] any warranty or liability for your use of this information.

## 2020-08-10 LAB
HPV SAMPLE: NORMAL
HPV, GENOTYPE 16: NOT DETECTED
HPV, GENOTYPE 18: NOT DETECTED
HPV, HIGH RISK OTHER: NOT DETECTED
HPV, INTERPRETATION: NORMAL
SPECIMEN DESCRIPTION: NORMAL

## 2020-08-11 LAB — CYTOLOGY REPORT: NORMAL

## 2020-11-16 ENCOUNTER — HOSPITAL ENCOUNTER (OUTPATIENT)
Dept: WOMENS IMAGING | Age: 40
Discharge: HOME | End: 2020-11-18
Payer: COMMERCIAL

## 2020-11-16 PROCEDURE — 77063 BREAST TOMOSYNTHESIS BI: CPT

## 2020-11-19 ENCOUNTER — TELEPHONE (OUTPATIENT)
Dept: OBGYN CLINIC | Age: 40
End: 2020-11-19

## 2020-11-19 NOTE — TELEPHONE ENCOUNTER
----- Message from CAMILLA Collins CNP sent at 11/18/2020  5:45 PM EST -----  Please notify pt her mammogram showed abnormality of  Bilateral breast.  She needs additional imaging, I will place order for diagnostic mammogram and US.

## 2020-12-07 ENCOUNTER — HOSPITAL ENCOUNTER (OUTPATIENT)
Dept: WOMENS IMAGING | Age: 40
Discharge: HOME | End: 2020-12-09
Payer: COMMERCIAL

## 2020-12-07 PROCEDURE — 76642 ULTRASOUND BREAST LIMITED: CPT

## 2020-12-08 ENCOUNTER — TELEPHONE (OUTPATIENT)
Dept: OBGYN CLINIC | Age: 40
End: 2020-12-08

## 2020-12-08 NOTE — TELEPHONE ENCOUNTER
----- Message from CAMILLA Willett CNP sent at 12/7/2020  5:53 PM EST -----  Her Bilateral Breast US results showed:   Benign-appearing cyst in each breast.     Recommendation: Annual mammography, or as is appropriate for the patient's  age, clinical symptoms, and dense breast tissues. Bilateral breast MRI in  the next few years may be helpful as a baseline in this patient with very  dense breasts. Her annual mammogram would be in NOvember 2021.      I would encourage her to monitor an dif breast mass or breast pain noted by shaan belcher to mammogram next year notify us

## 2021-07-22 ENCOUNTER — OFFICE VISIT (OUTPATIENT)
Dept: OBGYN CLINIC | Age: 41
End: 2021-07-22
Payer: COMMERCIAL

## 2021-07-22 ENCOUNTER — HOSPITAL ENCOUNTER (OUTPATIENT)
Age: 41
Setting detail: SPECIMEN
Discharge: HOME | End: 2021-07-22
Payer: COMMERCIAL

## 2021-07-22 VITALS — SYSTOLIC BLOOD PRESSURE: 118 MMHG | DIASTOLIC BLOOD PRESSURE: 72 MMHG

## 2021-07-22 DIAGNOSIS — R30.9 PAINFUL URINATION: ICD-10-CM

## 2021-07-22 DIAGNOSIS — R35.0 URINARY FREQUENCY: ICD-10-CM

## 2021-07-22 DIAGNOSIS — R30.0 DYSURIA: ICD-10-CM

## 2021-07-22 DIAGNOSIS — R30.0 DYSURIA: Primary | ICD-10-CM

## 2021-07-22 LAB
BILIRUBIN URINE: NEGATIVE
COLOR: YELLOW
COMMENT UA: ABNORMAL
GLUCOSE URINE: NEGATIVE
KETONES, URINE: NEGATIVE
LEUKOCYTE ESTERASE, URINE: NEGATIVE
NITRITE, URINE: NEGATIVE
PH UA: 7.5 (ref 5–8)
PROTEIN UA: NEGATIVE
SPECIFIC GRAVITY UA: 1 (ref 1–1.03)
TURBIDITY: CLEAR
URINE HGB: NEGATIVE
UROBILINOGEN, URINE: NORMAL

## 2021-07-22 PROCEDURE — 99213 OFFICE O/P EST LOW 20 MIN: CPT | Performed by: NURSE PRACTITIONER

## 2021-07-22 NOTE — PROGRESS NOTES
[facility]YS[facility]ANS  [facility] [address]  [address] [facility]  Dept: [phone]  Dept Fax: [phone]    Sherly Foreman is a 42 y.o. female who presents today for her medical conditions/complaintsas noted below. Sherly Foreman is c/o of Urinary Tract Infection        HPI:     HPI  Pt is here with complaints of dysuria and urinary frequency that started yesterday. She took an [address] and 1 macrobid she has from urologist and increased fluids, noted symptoms seem to improving today wants to make sure no UTI. She denies hematuria, flank pain. Denies vaginal discharge,Odor,Itching. No pelvic pain, fevers, chills, nausea/vomiting. Denies hx Kidney stones. No recent antibiotics, changes in soaps. No change in partners        Past Medical History:   Diagnosis Date    MDRO (multiple drug resistant organisms) resistance 2014    E. Coli urine      Past Surgical History:   Procedure Laterality Date     SECTION      VARICOSE VEIN SURGERY Bilateral        Family History   Problem Relation Age of Onset    Heart Attack Paternal Grandfather     Cancer Paternal Grandmother         lymphoma    High Blood Pressure Maternal Grandmother     Heart Disease Maternal Grandfather     Diabetes Mother     Heart Disease Mother     No Known Problems Father     No Known Problems Brother        Social History     Tobacco Use    Smoking status: Never Smoker    Smokeless tobacco: Never Used   Substance Use Topics    Alcohol use: No      Current Outpatient Medications   Medication Sig Dispense Refill    spironolactone (ALDACTONE) 100 MG tablet Take 100 mg by mouth daily. No current facility-administered medications for this visit.      Allergies   Allergen Reactions    Bactrim     Pcn [Penicillins]     Sulfa Antibiotics     Solosec [Secnidazole] Nausea And Vomiting     Made her very sick to her stomach       Health Maintenance   Topic Date Due    Hepatitis C screen  Never done    COVID-19 Vaccine (1) Never done    HIV screen  Never done    DTaP/Tdap/Td vaccine (1 - Tdap) Never done    Potassium monitoring  10/13/2016    Creatinine monitoring  10/13/2016    Lipid screen  04/19/2019    Cervical cancer screen  08/06/2021    Flu vaccine (1) 09/01/2021    Hepatitis A vaccine  Aged Out    Hepatitis B vaccine  Aged Out    Hib vaccine  Aged Out    Meningococcal (ACWY) vaccine  Aged Out    Pneumococcal 0-64 years Vaccine  Aged Out       Subjective:     Review of Systems   Constitutional: Negative for chills and fever. Genitourinary: Positive for dysuria and frequency. Negative for dyspareunia, flank pain, menstrual problem, pelvic pain, urgency, vaginal bleeding, vaginal discharge and vaginal pain. Objective:     Physical Exam  Vitals and nursing note reviewed. Constitutional:       General: She is not in acute distress. Appearance: She is well-developed. She is not diaphoretic. HENT:      Head: Normocephalic and atraumatic. Right Ear: External ear normal.      Left Ear: External ear normal.      Nose: Nose normal.   Eyes:      Pupils: Pupils are equal, round, and reactive to light. Neck:      Thyroid: No thyromegaly. Cardiovascular:      Rate and Rhythm: Normal rate and regular rhythm. Heart sounds: Normal heart sounds. No murmur heard. No friction rub. No gallop. Pulmonary:      Effort: Pulmonary effort is normal.      Breath sounds: Normal breath sounds. No wheezing. Abdominal:      General: Bowel sounds are normal.      Palpations: Abdomen is soft. Tenderness: There is no abdominal tenderness. There is no right CVA tenderness, left CVA tenderness or guarding. Musculoskeletal:      Cervical back: Normal range of motion and neck supple. Lymphadenopathy:      Cervical: No cervical adenopathy. Skin:     General: Skin is warm and dry. Findings: No rash.    Neurological:      Mental Status: She is alert and oriented to person, place, and time. Cranial Nerves: No cranial nerve deficit. Psychiatric:         Behavior: Behavior normal.         Thought Content: Thought content normal.         Judgment: Judgment normal.       /72 (Site: Right Upper Arm, Position: Sitting, Cuff Size: Medium Adult)   Breastfeeding No     Assessment:       Diagnosis Orders   1. Dysuria  Culture, Urine    Urinalysis   2. Painful urination  Culture, Urine    Urinalysis   3. Urinary frequency  Culture, Urine    Urinalysis       Urine dip in office was negative    Plan:      Dysuria/urinary frequency- seems to be improving, urine dip in office was negative will send for official UA and urinae culture. Encouraged to increase water intake monitor for worsening symptoms, notify us. If f/c, n/v, severe flank or abdominal pain go to Er. Return in about 1 month (around 8/25/2021) for annual exam as scheduled. Orders Placed This Encounter   Procedures    Culture, Urine     Standing Status:   Future     Standing Expiration Date:   7/22/2022     Order Specific Question:   Specify (ex-cath, midstream, cysto, etc)? Answer:   midstream    Urinalysis     Standing Status:   Future     Standing Expiration Date:   7/22/2022     No orders of the defined types were placed in this encounter. Patient given educational materials - seepatient instructions. Discussed use, benefit, and side effects of prescribed medications. All patient questions answered. Pt voiced understanding. Reviewed health maintenance. Instructed to continue current medications, diet and exercise. Patient agreedwith treatment plan. Follow up as directed. Electronically signed by CAMILLA Willett CNP on 7/22/2021at 2:50 PM      Of the 20 minute duration appointment visit, Jossie Johnson CNP spent at least 50% of the face-to-face time in counseling, explanation of diagnosis, planning of further management, and answering all questions.

## 2021-07-23 LAB
CULTURE: NO GROWTH
Lab: NORMAL
SPECIMEN DESCRIPTION: NORMAL

## 2021-08-20 ENCOUNTER — HOSPITAL ENCOUNTER (OUTPATIENT)
Age: 41
Discharge: HOME | End: 2021-08-20
Payer: COMMERCIAL

## 2021-08-20 LAB
ABSOLUTE EOS #: 0.1 K/UL (ref 0–0.4)
ABSOLUTE IMMATURE GRANULOCYTE: ABNORMAL K/UL (ref 0–0.3)
ABSOLUTE LYMPH #: 1.4 K/UL (ref 1–4.8)
ABSOLUTE MONO #: 0.4 K/UL (ref 0.1–1.3)
ALBUMIN SERPL-MCNC: 4.5 G/DL (ref 3.5–5.2)
ALBUMIN/GLOBULIN RATIO: NORMAL (ref 1–2.5)
ALP BLD-CCNC: 65 U/L (ref 35–104)
ALT SERPL-CCNC: 12 U/L (ref 5–33)
ANION GAP SERPL CALCULATED.3IONS-SCNC: 7 MMOL/L (ref 9–17)
AST SERPL-CCNC: 17 U/L
BASOPHILS # BLD: 1 % (ref 0–2)
BASOPHILS ABSOLUTE: 0 K/UL (ref 0–0.2)
BILIRUB SERPL-MCNC: 0.69 MG/DL (ref 0.3–1.2)
BILIRUBIN DIRECT: 0.17 MG/DL
BILIRUBIN URINE: NEGATIVE
BILIRUBIN, INDIRECT: 0.52 MG/DL (ref 0–1)
BUN BLDV-MCNC: 9 MG/DL (ref 6–20)
BUN/CREAT BLD: ABNORMAL (ref 9–20)
CALCIUM SERPL-MCNC: 8.8 MG/DL (ref 8.6–10.4)
CHLORIDE BLD-SCNC: 104 MMOL/L (ref 98–107)
CHOLESTEROL/HDL RATIO: 2.6
CHOLESTEROL: 164 MG/DL
CO2: 29 MMOL/L (ref 20–31)
COLOR: YELLOW
COMMENT UA: NORMAL
CREAT SERPL-MCNC: 0.68 MG/DL (ref 0.5–0.9)
DIFFERENTIAL TYPE: ABNORMAL
EOSINOPHILS RELATIVE PERCENT: 2 % (ref 0–4)
GFR AFRICAN AMERICAN: >60 ML/MIN
GFR NON-AFRICAN AMERICAN: >60 ML/MIN
GFR SERPL CREATININE-BSD FRML MDRD: ABNORMAL ML/MIN/{1.73_M2}
GFR SERPL CREATININE-BSD FRML MDRD: ABNORMAL ML/MIN/{1.73_M2}
GLOBULIN: NORMAL G/DL (ref 1.5–3.8)
GLUCOSE BLD-MCNC: 90 MG/DL (ref 70–99)
GLUCOSE URINE: NEGATIVE
HCT VFR BLD CALC: 43.7 % (ref 36–46)
HDLC SERPL-MCNC: 62 MG/DL
HEMOGLOBIN: 14.5 G/DL (ref 12–16)
IMMATURE GRANULOCYTES: ABNORMAL %
KETONES, URINE: NEGATIVE
LDL CHOLESTEROL: 95 MG/DL (ref 0–130)
LEUKOCYTE ESTERASE, URINE: NEGATIVE
LYMPHOCYTES # BLD: 23 % (ref 24–44)
MCH RBC QN AUTO: 29.4 PG (ref 26–34)
MCHC RBC AUTO-ENTMCNC: 33.3 G/DL (ref 31–37)
MCV RBC AUTO: 88.2 FL (ref 80–100)
MONOCYTES # BLD: 6 % (ref 1–7)
NITRITE, URINE: NEGATIVE
NRBC AUTOMATED: ABNORMAL PER 100 WBC
PDW BLD-RTO: 12.3 % (ref 11.5–14.9)
PH UA: 8 (ref 5–8)
PLATELET # BLD: 284 K/UL (ref 150–450)
PLATELET ESTIMATE: ABNORMAL
PMV BLD AUTO: 7.1 FL (ref 6–12)
POTASSIUM SERPL-SCNC: 4.4 MMOL/L (ref 3.7–5.3)
PROTEIN UA: NEGATIVE
RBC # BLD: 4.95 M/UL (ref 4–5.2)
RBC # BLD: ABNORMAL 10*6/UL
SEG NEUTROPHILS: 68 % (ref 36–66)
SEGMENTED NEUTROPHILS ABSOLUTE COUNT: 4.1 K/UL (ref 1.3–9.1)
SODIUM BLD-SCNC: 140 MMOL/L (ref 135–144)
SPECIFIC GRAVITY UA: 1.01 (ref 1–1.03)
THYROXINE, FREE: 1.23 NG/DL (ref 0.93–1.7)
TOTAL PROTEIN: 6.9 G/DL (ref 6.4–8.3)
TRIGL SERPL-MCNC: 37 MG/DL
TSH SERPL DL<=0.05 MIU/L-ACNC: 1.04 MIU/L (ref 0.3–5)
TURBIDITY: CLEAR
URINE HGB: NEGATIVE
UROBILINOGEN, URINE: NORMAL
VITAMIN D 25-HYDROXY: 33.4 NG/ML (ref 30–100)
VLDLC SERPL CALC-MCNC: NORMAL MG/DL (ref 1–30)
WBC # BLD: 6 K/UL (ref 3.5–11)
WBC # BLD: ABNORMAL 10*3/UL

## 2021-08-20 PROCEDURE — 84439 ASSAY OF FREE THYROXINE: CPT

## 2021-08-20 PROCEDURE — 84443 ASSAY THYROID STIM HORMONE: CPT

## 2021-08-20 PROCEDURE — 80048 BASIC METABOLIC PNL TOTAL CA: CPT

## 2021-08-20 PROCEDURE — 80061 LIPID PANEL: CPT

## 2021-08-20 PROCEDURE — 82306 VITAMIN D 25 HYDROXY: CPT

## 2021-08-20 PROCEDURE — 80076 HEPATIC FUNCTION PANEL: CPT

## 2021-08-20 PROCEDURE — 85025 COMPLETE CBC W/AUTO DIFF WBC: CPT

## 2021-08-20 PROCEDURE — 81003 URINALYSIS AUTO W/O SCOPE: CPT

## 2021-08-20 PROCEDURE — 36415 COLL VENOUS BLD VENIPUNCTURE: CPT

## 2021-08-25 ENCOUNTER — HOSPITAL ENCOUNTER (OUTPATIENT)
Age: 41
Setting detail: SPECIMEN
Discharge: HOME | End: 2021-08-25
Payer: COMMERCIAL

## 2021-08-25 ENCOUNTER — OFFICE VISIT (OUTPATIENT)
Dept: OBGYN CLINIC | Age: 41
End: 2021-08-25
Payer: COMMERCIAL

## 2021-08-25 VITALS
BODY MASS INDEX: 21.31 KG/M2 | SYSTOLIC BLOOD PRESSURE: 112 MMHG | RESPIRATION RATE: 16 BRPM | DIASTOLIC BLOOD PRESSURE: 60 MMHG | WEIGHT: 116.5 LBS

## 2021-08-25 DIAGNOSIS — N89.8 VAGINAL DISCHARGE: ICD-10-CM

## 2021-08-25 DIAGNOSIS — Z12.31 SCREENING MAMMOGRAM, ENCOUNTER FOR: ICD-10-CM

## 2021-08-25 DIAGNOSIS — Z01.419 WOMEN'S ANNUAL ROUTINE GYNECOLOGICAL EXAMINATION: Primary | ICD-10-CM

## 2021-08-25 PROCEDURE — 99396 PREV VISIT EST AGE 40-64: CPT | Performed by: NURSE PRACTITIONER

## 2021-08-25 ASSESSMENT — ENCOUNTER SYMPTOMS
VOMITING: 0
NAUSEA: 0
DIARRHEA: 0
ABDOMINAL PAIN: 0
BACK PAIN: 0
SHORTNESS OF BREATH: 0
CONSTIPATION: 0
COLOR CHANGE: 0
COUGH: 0

## 2021-08-25 NOTE — PROGRESS NOTES
Eliud Braun is a 42 y.o.  here for her annual exam.  The patient was seen and examined. The patients past medical, surgical, social and family history were reviewed. Current medications and allergies were reviewed, and documented in the chart. Has had 3 children- 1 girl and 2 boys. She is . 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      Exercise Yes  Diet Yes  Tobacco abuse No     Last PAP: 2020- negative hx of abnormal PAP yes - years ago, states repeat was normal  Family hx uterine or ovarian cancer-denies  Last mammogram- 2020- abnormality of  Bilateral breast Had shanna breast US afterHer Bilateral Breast US results showed:   Benign-appearing cyst in each breast.     Recommendation: Annual mammography, or as is appropriate for the patient's  age, clinical symptoms, and dense breast tissues.  Bilateral breast MRI in  the next few years may be helpful as, Family hx of breast cancer -denies  family hx colon cancer -denies           Sexually active: yes - with , multiple partners: No, Dyspareunia: No, Vaginal discharge: no,  UTI symptoms: no, voiding difficulties: no, bowels regular:Yes bloating:no       Menstrual history:  Menarche age- 14, cycle every month   lasts 5-7 days. Some increased PMS symptoms/agitation  but does not want to consider hormonal contraceptive or SSRI at  This point will monitor and notify us if worsens.    Birth control: vasectomy  LMP 20    OB History    Para Term  AB Living   3 3 3     3   SAB TAB Ectopic Molar Multiple Live Births             1      # Outcome Date GA Lbr Ollie/2nd Weight Sex Delivery Anes PTL Lv   3 Term            2 Term      CS-LTranv   DEEPAK   1 Term                Vitals:    21 1550   BP: 112/60   Site: Left Upper Arm   Position: Sitting   Cuff Size: Large Adult   Resp: 16   Weight: 116 lb 8 oz (52.8 kg)       Wt Readings from Last 3 Encounters:   21 116 lb 8 oz (52.8 kg)   20 115 lb (52.2 kg) 20 118 lb (53.5 kg)     Past Medical History:   Diagnosis Date    MDRO (multiple drug resistant organisms) resistance 2014    E. Coli urine                                                                   Past Surgical History:   Procedure Laterality Date     SECTION      VARICOSE VEIN SURGERY Bilateral      Family History   Problem Relation Age of Onset    Heart Attack Paternal Grandfather     Cancer Paternal Grandmother         lymphoma    High Blood Pressure Maternal Grandmother     Heart Disease Maternal Grandfather     Diabetes Mother     Heart Disease Mother     No Known Problems Father     No Known Problems Brother      Social History     Tobacco Use   Smoking Status Never Smoker   Smokeless Tobacco Never Used     Social History     Substance and Sexual Activity   Alcohol Use No        Social History     Tobacco History     Smoking Status  Never Smoker    Smokeless Tobacco Use  Never Used          Alcohol History     Alcohol Use Status  No          Drug Use     Drug Use Status  No          Sexual Activity     Sexually Active  Yes Partners  Male              Allergies   Allergen Reactions    Bactrim     Pcn [Penicillins]     Sulfa Antibiotics     Solosec [Secnidazole] Nausea And Vomiting     Made her very sick to her stomach     Current Outpatient Medications   Medication Sig Dispense Refill    spironolactone (ALDACTONE) 100 MG tablet Take 100 mg by mouth daily. No current facility-administered medications for this visit. Subjective:     Review of Systems   Constitutional: Negative for chills, fatigue, fever and unexpected weight change. Eyes: Negative for visual disturbance. Respiratory: Negative for cough and shortness of breath. Cardiovascular: Negative for chest pain, palpitations and leg swelling. Gastrointestinal: Negative for abdominal pain, constipation, diarrhea, nausea and vomiting.    Endocrine: Negative for cold intolerance, heat intolerance, polydipsia and polyuria. Genitourinary: Negative for dyspareunia, dysuria, flank pain, menstrual problem, pelvic pain, vaginal bleeding, vaginal discharge and vaginal pain. Musculoskeletal: Negative for back pain and myalgias. Skin: Negative for color change and rash. Neurological: Negative for dizziness, light-headedness and headaches. Hematological: Negative for adenopathy. Does not bruise/bleed easily. Psychiatric/Behavioral: Positive for agitation (prior to menses). Negative for self-injury and suicidal ideas. Objective:     Physical Exam  Vitals and nursing note reviewed. Constitutional:       General: She is not in acute distress. Appearance: She is well-developed. She is not diaphoretic. HENT:      Head: Normocephalic and atraumatic. Right Ear: External ear normal.      Left Ear: External ear normal.      Nose: Nose normal.   Eyes:      Pupils: Pupils are equal, round, and reactive to light. Neck:      Thyroid: No thyromegaly. Cardiovascular:      Rate and Rhythm: Normal rate and regular rhythm. Heart sounds: Normal heart sounds. No murmur heard. No friction rub. No gallop. Comments: No bilateral calf tenderness or swelling  Pulmonary:      Effort: Pulmonary effort is normal. No respiratory distress. Breath sounds: Normal breath sounds. No wheezing. Abdominal:      General: Bowel sounds are normal.      Palpations: Abdomen is soft. Tenderness: There is no abdominal tenderness. Genitourinary:     Comments: Breasts nipples everted, no masses or tenderness, does BSE  Vulva-no lesions  Vagina-pink rugated  Cervix-firm, 2 cm. Nontender, freely movable, no lesions  Uterus-ant. Smooth, firm, nontender, freely movable  Adnexa-no masses or tenderness   Musculoskeletal:         General: Normal range of motion. Cervical back: Normal range of motion and neck supple. Lymphadenopathy:      Cervical: No cervical adenopathy.    Skin: General: Skin is warm and dry. Findings: No rash. Neurological:      Mental Status: She is alert and oriented to person, place, and time. Cranial Nerves: No cranial nerve deficit. Deep Tendon Reflexes: Reflexes are normal and symmetric. Psychiatric:         Behavior: Behavior normal.         Thought Content: Thought content normal.         Judgment: Judgment normal.       /60 (Site: Left Upper Arm, Position: Sitting, Cuff Size: Large Adult)   Resp 16   Wt 116 lb 8 oz (52.8 kg)   BMI 21.31 kg/m²     Assessment:       Diagnosis Orders   1. Women's annual routine gynecological examination  PAP Smear   2. Screening mammogram, encounter for  OBEY DIGITAL SCREEN W OR WO CAD BILATERAL       Breast exam completed. Pelvic exam pap smear collected and sent. Cultures sent No    Plan:   Collect pap   BSE reviewed, Mammogram ordered: yes  STD prevention reviewed  Gardisil counseling completed for all patients 10-37 yo  Cultures declined     Diet & Exercise reviewed with pt. Preventive  Health through PCP   RV prn/annual           Orders Placed This Encounter   Procedures    OBEY DIGITAL SCREEN W OR WO CAD BILATERAL     Standing Status:   Future     Standing Expiration Date:   10/25/2022     Order Specific Question:   Reason for exam:     Answer:   annual screening mammogram    PAP Smear     Patient History:    No LMP recorded. OBGYN Status: Having periods  Past Surgical History:  No date:  SECTION  2015: VARICOSE VEIN SURGERY; Bilateral      Social History    Tobacco Use      Smoking status: Never Smoker      Smokeless tobacco: Never Used       Standing Status:   Future     Standing Expiration Date:   2022     Order Specific Question:   Collection Type     Answer: Thin Prep     Order Specific Question:   Prior Abnormal Pap Test     Answer:   No     Order Specific Question:   Screening or Diagnostic     Answer:   Screening     Order Specific Question:   HPV Requested?      Answer: Yes     Order Specific Question:   High Risk Patient     Answer:   N/A     No orders of the defined types were placed in this encounter. Patient given educational materials - seepatient instructions. Discussed use, benefit, and side effects of prescribed medications. All patient questions answered. Pt voiced understanding. Reviewed health maintenance. Instructed to continue current medications, diet and exercise. Patient agreedwith treatment plan. Follow up as directed.       Electronically signed by CAMILLA Dowling CNP on 8/25/2021at 4:05 PM

## 2021-08-25 NOTE — PATIENT INSTRUCTIONS
Patient Education      Patient Education        Learning About Breast Cancer Screening  What is breast cancer screening? Breast cancer occurs when cells that are not normal grow in one or both of your breasts. Screening tests can help find breast cancer early. Cancer is easier to treat when it's found early. Having concerns about breast cancer is common. That's why it's important to talk with your doctor about when to start and how often to get screened for breast cancer. How is breast cancer screening done? Several screening tests can be used to check for breast cancer. Mammograms. These tests check for signs of cancer using X-rays. They can show tumors that are too small for you or your doctor to feel. During a mammogram, a machine squeezes your breasts to make them flatter and easier to X-ray. At least two pictures are taken of each breast. One is taken from the top and one from the side. 3-D mammograms. These tests are also called digital breast tomosynthesis. Your breast is positioned on a flat plate. A top plate is pressed against your breast to keep it in position. The X-ray arm then moves in an arc above the breast and takes many pictures. A computer uses these X-rays to create a three-dimensional image. Clinical breast exam.   In this exam, your doctor carefully feels your breasts and under your arms to check for lumps or other changes. Who should be screened for breast cancer? Experts agree that mammograms are the best screening test for people at average risk of breast cancer. But they don't all agree on the age at which screening should start. And they don't agree on whether it's better to be screened every year or every two years. Here are some of the recommendations from experts:  · Start by age 36 and have a mammogram each year. · Start at age 39 and have a mammogram each year. · Start at age 48 and have a mammogram every 2 years. When to stop having mammograms is another decision. You and your doctor can decide on the right age to start and stop screening based on your personal preferences and overall health. What is your risk for breast cancer? If you don't already know your risk of breast cancer, you can ask your doctor about it. You can also look it up at www.cancer.gov/bcrisktool/. If your doctor says that you have a high or very high risk, ask about ways to reduce your risk. These could include getting extra screening, taking medicine, or having surgery. If you have a strong family history of breast cancer, ask your doctor about genetic testing. What steps can you take to stay healthy? Some things that increase your risk of breast cancer, such as your age and being female, cannot be controlled. But you can do some things to stay as healthy as you can. · Learn what your breasts normally look and feel like. If you notice any changes, tell your doctor. · If you drink alcohol, limit how much you drink. Any amount of alcohol may increase your risk for some types of cancer. · If you smoke, quit. When you quit smoking, you lower your chances of getting many types of cancer. You can also do your best to eat well, be active, and stay at a healthy weight. Eating healthy foods and being active every day, as well as staying at a healthy weight, may help prevent cancer. Where can you learn more? Go to https://StylecrookshawandaGOBA.DentLight. org and sign in to your [employer] account. Enter E733 in the Tekora box to learn more about \"Learning About Breast Cancer Screening. \"     If you do not have an account, please click on the \"Sign Up Now\" link. Current as of: December 17, 2020               Content Version: 12.9  © 1755-2272 Healthwise, [employer]. Care instructions adapted under license by [facility] ([facility]).  If you have questions about a medical condition or this instruction, always ask your healthcare professional. Ester Hopkins disclaims any warranty or liability for your use of this information. Pap Test: Care Instructions  Overview     The Pap test (also called a Pap smear) is a screening test for cancer of the cervix, which is the lower part of the uterus that opens into the vagina. The test can help your doctor find early changes in the cells that could lead to cancer. The sample of cells taken during your test has been sent to a lab so that an expert can look at the cells. It usually takes a week or two to get the results back. Follow-up care is a key part of your treatment and safety. Be sure to make and go to all appointments, and call your doctor if you are having problems. It's also a good idea to know your test results and keep a list of the medicines you take. What do the results mean? · A normal result means that the test did not find any abnormal cells in the sample. · An abnormal result can mean many things. Most of these are not cancer. The results of your test may be abnormal because:  ? You have an infection of the vagina or cervix, such as a yeast infection. ? You have an IUD (intrauterine device for birth control). ? You have low estrogen levels after menopause that are causing the cells to change. ? You have cell changes that may be a sign of precancer or cancer. The results are ranked based on how serious the changes might be. There are many other reasons why you might not get a normal result. If the results were abnormal, you may need to get another test within a few weeks or months. If the results show changes that could be a sign of cancer, you may need a test called a colposcopy, which provides a more complete view of the cervix. Sometimes the lab cannot use the sample because it does not contain enough cells or was not preserved well. If so, you may need to have the test again. This is not common, but it does happen from time to time. When should you call for help?   Watch closely for changes in your health, and be sure to contact your doctor if:    · You have vaginal bleeding or pain for more than 2 days after the test. It is normal to have a small amount of bleeding for a day or two after the test.   Where can you learn more? Go to https://chdulce.healthResponsive [employer] Group. org and sign in to your [employer] account. Enter B673 in the Qalendra box to learn more about \"Pap Test: Care Instructions. \"     If you do not have an account, please click on the \"Sign Up Now\" link. Current as of: December 17, 2020               Content Version: 12.9  © 3262-8798 Healthwise, Incorporated. Care instructions adapted under license by [facility] ([facility]). If you have questions about a medical condition or this instruction, always ask your healthcare professional. [address] any warranty or liability for your use of this information.

## 2021-08-26 ENCOUNTER — TELEPHONE (OUTPATIENT)
Dept: OBGYN CLINIC | Age: 41
End: 2021-08-26

## 2021-08-26 LAB
DIRECT EXAM: ABNORMAL
Lab: ABNORMAL
SPECIMEN DESCRIPTION: ABNORMAL

## 2021-08-26 RX ORDER — METRONIDAZOLE 7.5 MG/G
GEL VAGINAL
Qty: 1 TUBE | Refills: 0 | Status: SHIPPED | OUTPATIENT
Start: 2021-08-26 | End: 2021-09-02

## 2021-08-26 NOTE — TELEPHONE ENCOUNTER
----- Message from Shameka Capps, CAMILLA - CNP sent at 8/26/2021  9:44 AM EDT -----  + BV please offer either solosec 2gm x1 or flagyl 500mgbid for 7 days #14 no alcohol

## 2021-08-26 NOTE — TELEPHONE ENCOUNTER
Prefers the gel, solosec made her sick. It pops up as an allergy but this is not pill form.     Pending for you to sign

## 2021-09-02 LAB — CYTOLOGY REPORT: NORMAL

## 2022-04-12 ENCOUNTER — HOSPITAL ENCOUNTER (OUTPATIENT)
Dept: WOMENS IMAGING | Age: 42
Discharge: HOME | End: 2022-04-14
Payer: COMMERCIAL

## 2022-04-12 DIAGNOSIS — Z12.31 SCREENING MAMMOGRAM, ENCOUNTER FOR: ICD-10-CM

## 2022-04-12 PROCEDURE — 77063 BREAST TOMOSYNTHESIS BI: CPT

## 2022-06-06 ENCOUNTER — HOSPITAL ENCOUNTER (OUTPATIENT)
Age: 42
Setting detail: SPECIMEN
Discharge: HOME | End: 2022-06-06

## 2022-06-06 ENCOUNTER — OFFICE VISIT (OUTPATIENT)
Dept: OBGYN CLINIC | Age: 42
End: 2022-06-06
Payer: COMMERCIAL

## 2022-06-06 VITALS — BODY MASS INDEX: 22.31 KG/M2 | WEIGHT: 122 LBS | SYSTOLIC BLOOD PRESSURE: 118 MMHG | DIASTOLIC BLOOD PRESSURE: 62 MMHG

## 2022-06-06 DIAGNOSIS — N89.8 VAGINAL ODOR: ICD-10-CM

## 2022-06-06 DIAGNOSIS — N93.9 VAGINAL BLEEDING: ICD-10-CM

## 2022-06-06 DIAGNOSIS — N89.8 VAGINAL DISCHARGE: Primary | ICD-10-CM

## 2022-06-06 LAB
CANDIDA SPECIES, DNA PROBE: NEGATIVE
GARDNERELLA VAGINALIS, DNA PROBE: POSITIVE
SOURCE: ABNORMAL
TRICHOMONAS VAGINALIS DNA: NEGATIVE

## 2022-06-06 PROCEDURE — 99214 OFFICE O/P EST MOD 30 MIN: CPT | Performed by: NURSE PRACTITIONER

## 2022-06-06 ASSESSMENT — ENCOUNTER SYMPTOMS
WHEEZING: 0
ABDOMINAL PAIN: 0
NAUSEA: 0
VOMITING: 0
COLOR CHANGE: 0
SHORTNESS OF BREATH: 0

## 2022-06-06 NOTE — PROGRESS NOTES
[address] OB/GYN [facility]  [place]  [address] [address]  Dept: [phone]  Dept Fax: [phone]    Leni Nevarez is a 36 y.o. female who presents today for her medical conditions/complaintsas noted below. Leni Nevarez is c/o of Routine Prenatal Visit        HPI:     HPI  Pt is here with complaints of a vaginal discharge with positive Odor, has noted this for past 6 months, will note about 10 days prior to starting her menses that she will have vaginal odor/ and discharge for few days then will typically go away on its own. Last month though  Had increased thicker d/c took monistat OTC seemed to improve but then had intercourse and states after intercourse increased discharge and then had spotting for 3 days before she would have been due for her period. Denies dyspareunia. States still had menses then on .  She states cycle prior had noted some increased spotting with her menses. Denies Itching,  Burning. No UTI sx, no pelvic pain, fevers, chills, nausea/vomiting. No recent antibiotics, changes in soaps. No change in partners  LMP- 21, states cycle tends to be every 21- 28 days for years has been like this.  has had vasectomy. Past Medical History:   Diagnosis Date    MDRO (multiple drug resistant organisms) resistance 2014    E.  Coli urine      Past Surgical History:   Procedure Laterality Date     SECTION      VARICOSE VEIN SURGERY Bilateral        Family History   Problem Relation Age of Onset    Heart Attack Paternal Grandfather     Cancer Paternal Grandmother         lymphoma    High Blood Pressure Maternal Grandmother     Heart Disease Maternal Grandfather     Diabetes Mother     Heart Disease Mother     No Known Problems Father     No Known Problems Brother        Social History     Tobacco Use    Smoking status: Never Smoker    Smokeless tobacco: Never Used Substance Use Topics    Alcohol use: No      Current Outpatient Medications   Medication Sig Dispense Refill    spironolactone (ALDACTONE) 100 MG tablet Take 100 mg by mouth daily. No current facility-administered medications for this visit. Allergies   Allergen Reactions    Bactrim     Pcn [Penicillins]     Sulfa Antibiotics     Solosec [Secnidazole] Nausea And Vomiting     Made her very sick to her stomach       Health Maintenance   Topic Date Due    COVID-19 Vaccine (1) Never done    Depression Screen  Never done    HIV screen  Never done    Hepatitis C screen  Never done    DTaP/Tdap/Td vaccine (1 - Tdap) Never done    Flu vaccine (Season Ended) 09/01/2022    Lipids  08/20/2026    Cervical cancer screen  08/25/2026    Hepatitis A vaccine  Aged Out    Hepatitis B vaccine  Aged Out    Hib vaccine  Aged Out    Meningococcal (ACWY) vaccine  Aged Out    Pneumococcal 0-64 years Vaccine  Aged Out       Subjective:     Review of Systems   Constitutional: Negative for chills and fever. Respiratory: Negative for shortness of breath and wheezing. Cardiovascular: Negative for chest pain, palpitations and leg swelling. Gastrointestinal: Negative for abdominal pain, nausea and vomiting. Genitourinary: Positive for menstrual problem, vaginal bleeding and vaginal discharge. Negative for dyspareunia, flank pain, pelvic pain, urgency and vaginal pain. Skin: Negative for color change and pallor. Neurological: Negative for dizziness and headaches. Hematological: Negative for adenopathy. Does not bruise/bleed easily. Psychiatric/Behavioral: Negative for self-injury and suicidal ideas. Objective:     Physical Exam  Vitals and nursing note reviewed. Constitutional:       General: She is not in acute distress. Appearance: She is well-developed. She is not diaphoretic. HENT:      Head: Normocephalic and atraumatic.       Right Ear: External ear normal.      Left Ear: External ear normal.      Nose: Nose normal.   Eyes:      Pupils: Pupils are equal, round, and reactive to light. Cardiovascular:      Rate and Rhythm: Normal rate and regular rhythm. Heart sounds: Normal heart sounds. No murmur heard. No friction rub. No gallop. Pulmonary:      Effort: Pulmonary effort is normal.      Breath sounds: Normal breath sounds. No wheezing. Abdominal:      General: Bowel sounds are normal.      Palpations: Abdomen is soft. Abdomen is not rigid. Tenderness: There is no abdominal tenderness. There is no guarding or rebound. Hernia: There is no hernia in the left inguinal area. Genitourinary:     Labia:         Right: No rash, tenderness, lesion or injury. Left: No rash, tenderness, lesion or injury. Vagina: Normal. No signs of injury and foreign body. No vaginal discharge, erythema, tenderness or bleeding. Cervix: No cervical motion tenderness, discharge or friability. Uterus: Not enlarged and not tender. Adnexa:         Right: No mass, tenderness or fullness. Left: No mass, tenderness or fullness. Musculoskeletal:         General: Normal range of motion. Cervical back: Normal range of motion and neck supple. Skin:     General: Skin is warm and dry. Findings: No rash. Neurological:      Mental Status: She is alert and oriented to person, place, and time. Cranial Nerves: No cranial nerve deficit. Psychiatric:         Behavior: Behavior normal.         Thought Content: Thought content normal.         Judgment: Judgment normal.       /62 (Site: Left Upper Arm, Position: Sitting, Cuff Size: Medium Adult)   Wt 122 lb (55.3 kg)   Breastfeeding No   BMI 22.31 kg/m²     Assessment:       Diagnosis Orders   1. Vaginal discharge  Vaginitis DNA Probe   2. Vaginal odor  Vaginitis DNA Probe   3.  Vaginal bleeding  Vaginitis DNA Probe       Pelvic exam completed cultures obtained and sent    Plan: Vaginal discharge, odor, irregular bleeding- check cultures ( vag dna, declined GC) tx if positive. No CMT tenderness or pelvic tenderness with exam, denies consitutional symptoms. Monitor for fevers, chills, n/v, abdominal/pelvic let us know if occurs. Recommend she use hygiene measures that avoid any tight fitting clothing, wipes or soaps with fragrance. Use a fragrance free pH neutral soap. Recommend start probiotic  Could consider metronidazole gel 0.75 percent for 7 to 10 days followed by twice weekly dosing of gel for four to six months or boric acid suppositories for 15-30 days. Discussed if culture + and treat infection monitor bleeding if continue to have irregular vaginal bleeding, will need further evaluation, consider cbc, tsh, hcg, prolactin and pelvic US. Return if symptoms worsen or fail to improve, for annual exam.    Orders Placed This Encounter   Procedures    Vaginitis DNA Probe     No orders of the defined types were placed in this encounter. Patient given educational materials - seepatient instructions. Discussed use, benefit, and side effects of prescribed medications. All patient questions answered. Pt voiced understanding. Reviewed health maintenance. Instructed to continue current medications, diet and exercise. Patient agreedwith treatment plan. Follow up as directed. Electronically signed by CAMILLA Barcenas CNP on 6/6/2022at 12:41 PM      Of the 30 minute duration appointment visit, Alpa Earl CNP spent at least 50% of the face-to-face time in counseling, explanation of diagnosis, planning of further management, and answering all questions.

## 2022-06-07 ENCOUNTER — TELEPHONE (OUTPATIENT)
Dept: OBGYN CLINIC | Age: 42
End: 2022-06-07

## 2022-06-07 RX ORDER — METRONIDAZOLE 7.5 MG/G
GEL VAGINAL
Qty: 1 EACH | Refills: 0 | Status: SHIPPED | OUTPATIENT
Start: 2022-06-07 | End: 2022-06-14

## 2022-06-07 NOTE — TELEPHONE ENCOUNTER
----- Message from Susan Ashford, APRN - CNP sent at 6/7/2022 11:23 AM EDT -----  + BV please see if she wants metrogel or flagyl (no alcohol with this) she did not want solosec.   Please pend whichever she would like thanks

## 2022-08-01 ENCOUNTER — OFFICE VISIT (OUTPATIENT)
Dept: OBGYN CLINIC | Age: 42
End: 2022-08-01
Payer: COMMERCIAL

## 2022-08-01 VITALS — BODY MASS INDEX: 21.77 KG/M2 | SYSTOLIC BLOOD PRESSURE: 120 MMHG | DIASTOLIC BLOOD PRESSURE: 62 MMHG | WEIGHT: 119 LBS

## 2022-08-01 DIAGNOSIS — N63.20 LEFT BREAST LUMP: Primary | ICD-10-CM

## 2022-08-01 PROCEDURE — 99213 OFFICE O/P EST LOW 20 MIN: CPT | Performed by: NURSE PRACTITIONER

## 2022-08-01 ASSESSMENT — ENCOUNTER SYMPTOMS
VOMITING: 0
COLOR CHANGE: 0
NAUSEA: 0

## 2022-08-01 NOTE — PROGRESS NOTES
[address] [facility] [place]  [place]  [address] [address]  Dept: [phone]  Dept Fax: [phone]     Jeffry Lyles is a 36 y.o. female who presents today for her medical conditions/complaintsas noted below. Jeffry Lyles is c/o of Breast Problem (Lump left breast )        HPI:     HPI    Ronaldo Soulier presents to the office with complaints of left lump. She has noted this for 1 week. She states breast are always tender and mass seems tender but denies significant pain. Denies change in size/pain. Denies any known injury. Denies skin changes, increased warmth, fevers, chills, nausea, vomiting or galactorrhea. Family history of breast/ovarain cancer: denies      Last screening mammogram in 2022- negative evidence malignancy - is noted to have dense breast tissue     Past Medical History:   Diagnosis Date    MDRO (multiple drug resistant organisms) resistance 2014    E. Coli urine      Past Surgical History:   Procedure Laterality Date     SECTION      VARICOSE VEIN SURGERY Bilateral        Family History   Problem Relation Age of Onset    Heart Attack Paternal Grandfather     Cancer Paternal Grandmother         lymphoma    High Blood Pressure Maternal Grandmother     Heart Disease Maternal Grandfather     Diabetes Mother     Heart Disease Mother     No Known Problems Father     No Known Problems Brother        Social History     Tobacco Use    Smoking status: Never    Smokeless tobacco: Never   Substance Use Topics    Alcohol use: No      Current Outpatient Medications   Medication Sig Dispense Refill    spironolactone (ALDACTONE) 50 MG tablet take 1 tablet by mouth twice a day      spironolactone (ALDACTONE) 100 MG tablet Take 100 mg by mouth daily. No current facility-administered medications for this visit.      Allergies   Allergen Reactions    Bactrim     Pcn [Penicillins]     Sulfa Antibiotics Solosec [Secnidazole] Nausea And Vomiting     Made her very sick to her stomach       Health Maintenance   Topic Date Due    Depression Screen  Never done    HIV screen  Never done    Hepatitis C screen  Never done    DTaP/Tdap/Td vaccine (1 - Tdap) Never done    COVID-19 Vaccine (3 - Booster for Pfizer series) 08/12/2021    Flu vaccine (1) 09/01/2022    Lipids  08/20/2026    Cervical cancer screen  08/25/2026    Hepatitis A vaccine  Aged Out    Hepatitis B vaccine  Aged Out    Hib vaccine  Aged Out    Meningococcal (ACWY) vaccine  Aged Out    Pneumococcal 0-64 years Vaccine  Aged Out       Subjective:     Review of Systems   Constitutional:  Negative for chills and fever. Gastrointestinal:  Negative for nausea and vomiting. Genitourinary:  Negative for vaginal bleeding, vaginal discharge and vaginal pain. Skin:  Negative for color change, pallor, rash and wound. Neurological:  Negative for dizziness and light-headedness. Hematological:  Negative for adenopathy. Does not bruise/bleed easily. Psychiatric/Behavioral:  Negative for self-injury and suicidal ideas. Objective:     Physical Exam  Vitals and nursing note reviewed. Constitutional:       General: She is not in acute distress. Appearance: She is well-developed. She is not diaphoretic. HENT:      Head: Normocephalic and atraumatic. Right Ear: External ear normal.      Left Ear: External ear normal.      Nose: Nose normal.   Eyes:      Pupils: Pupils are equal, round, and reactive to light. Neck:      Thyroid: No thyromegaly. Cardiovascular:      Rate and Rhythm: Normal rate and regular rhythm. Heart sounds: Normal heart sounds. No murmur heard. No friction rub. No gallop. Pulmonary:      Effort: Pulmonary effort is normal.      Breath sounds: Normal breath sounds. No wheezing.    Chest:   Breasts:     Right: No inverted nipple, mass, nipple discharge, skin change, tenderness, axillary adenopathy or supraclavicular adenopathy. Left: Mass present. No bleeding, inverted nipple, nipple discharge, skin change, tenderness, axillary adenopathy or supraclavicular adenopathy. Abdominal:      General: Bowel sounds are normal.      Palpations: Abdomen is soft. Tenderness: There is no abdominal tenderness. Musculoskeletal:         General: Normal range of motion. Cervical back: Normal range of motion and neck supple. Lymphadenopathy:      Cervical: No cervical adenopathy. Upper Body:      Right upper body: No supraclavicular or axillary adenopathy. Left upper body: No supraclavicular or axillary adenopathy. Skin:     General: Skin is warm and dry. Findings: No rash. Neurological:      Mental Status: She is alert and oriented to person, place, and time. Cranial Nerves: No cranial nerve deficit. Psychiatric:         Behavior: Behavior normal.         Thought Content: Thought content normal.         Judgment: Judgment normal.     /62 (Site: Left Upper Arm, Position: Sitting, Cuff Size: Medium Adult)   Wt 119 lb (54 kg)   BMI 21.77 kg/m²     Assessment:          Diagnosis Orders   1. Left breast lump  US BREAST COMPLETE LEFT    OBEY DIGITAL DIAGNOSTIC W OR WO CAD BILATERAL          Plan:      Left breast lump-  Reviewed screening mammogram from April 2022  As noted , mammogram and Left breast US ordered. Will notify of results monitor any worsening symptoms notify us           Return if symptoms worsen or fail to improve, for annual exam.       Orders Placed This Encounter   Procedures    US BREAST COMPLETE LEFT     Standing Status:   Future     Standing Expiration Date:   8/1/2023    OBEY DIGITAL DIAGNOSTIC W OR WO CAD BILATERAL     Standing Status:   Future     Standing Expiration Date:   10/1/2023     No orders of the defined types were placed in this encounter. Patient given educational materials - seepatient instructions.   Discussed use, benefit, and side effects of prescribed medications. All patient questions answered. Pt voiced understanding. Reviewed health maintenance. Instructed to continue current medications, diet and exercise. Patient agreedwith treatment plan. Follow up as directed. Electronically signed by CAMILLA Moran CNP on 8/1/2022at 2:33 PM    Of the 20 minute duration appointment visit, Evan Lucia CNP spent at least 50% of the face-to-face time in counseling, explanation of diagnosis, planning of further management, and answering all questions.

## 2022-08-09 ENCOUNTER — HOSPITAL ENCOUNTER (OUTPATIENT)
Dept: WOMENS IMAGING | Age: 42
Discharge: HOME | End: 2022-08-11
Payer: COMMERCIAL

## 2022-08-09 DIAGNOSIS — N63.20 LEFT BREAST LUMP: ICD-10-CM

## 2022-08-09 PROCEDURE — 76642 ULTRASOUND BREAST LIMITED: CPT

## 2022-08-09 PROCEDURE — G0279 TOMOSYNTHESIS, MAMMO: HCPCS

## 2022-10-27 RX ORDER — IBUPROFEN 200 MG
200 TABLET ORAL EVERY 6 HOURS PRN
COMMUNITY

## 2022-10-27 RX ORDER — NITROFURANTOIN 25; 75 MG/1; MG/1
100 CAPSULE ORAL
COMMUNITY

## 2022-10-27 NOTE — PROGRESS NOTES
DAY OF SURGERY/PROCEDURE  GUIDELINES    As a patient at the [facility], you can expect quality medical and nursing care that is centered on your individual needs. It is our goal to make your surgical experience as comfortable and excellent as possible.  ________________________________________________________________________    The following instructions are general guidelines, if any information on this sheet is different from what your doctor has instructed you to do, please follow your doctor's instructions. Please arrive on 11/4 @ 915      Enter through entrance C. Check in at registration     Upon arrival you will be taken to the pre-operative area to get ready for surgery, your family will stay in the waiting room and visit with you once you are ready for surgery. Due to special limitations please limit visitation to 1-2 members of your family at a time. When it is time for surgery your family will return to the waiting room. Nothing to eat, drink, smoke, suck or chew after midnight (no water, gum, mints, cigarettes, cigars, pipes, snuff, chewing tobacco, etc.) or your surgery may be canceled. Take a shower or bath on the morning of your surgery/procedure (Hibiclens if directed)    Brush your teeth, but do not swallow any water    IN CASE OF ILLNESS - If you have a cold or flu symptoms (high fever, runny nose, sore throat, cough, etc.) rash, nausea, vomiting, loose stools, and/or recent contact with someone who has a contagious disease (chick pox, measles, etc.) please call your doctor before coming to the surgery center    If applicable bring your:  Eyeglasses and Case (If you wear contacts they have to be removed before surgery, bring case and solution)    DO NOT take anticoagulants (blood thinners, aspirin or aspirin-containing products) as instructed by your physician. .     Wear loose, comfortable clothing that is easy to put on and take off.  They will remain in post-op with the nurse. If you will be returning home the same day as your surgery, you will need to have a responsible adult (25years of age or older) present to drive you home. You will need someone stay with you at home for the first 24 hours following your surgery. This is due to the anesthesia and the medication given to you during surgery and recovery.

## 2022-11-03 ENCOUNTER — ANESTHESIA EVENT (OUTPATIENT)
Dept: OPERATING ROOM | Age: 42
End: 2022-11-03
Payer: COMMERCIAL

## 2022-11-04 ENCOUNTER — HOSPITAL ENCOUNTER (OUTPATIENT)
Age: 42
Setting detail: OUTPATIENT SURGERY
Discharge: HOME | End: 2022-11-04
Attending: PLASTIC SURGERY | Admitting: PLASTIC SURGERY
Payer: COMMERCIAL

## 2022-11-04 ENCOUNTER — ANESTHESIA (OUTPATIENT)
Dept: OPERATING ROOM | Age: 42
End: 2022-11-04
Payer: COMMERCIAL

## 2022-11-04 VITALS
SYSTOLIC BLOOD PRESSURE: 127 MMHG | HEART RATE: 61 BPM | OXYGEN SATURATION: 100 % | BODY MASS INDEX: 20.66 KG/M2 | WEIGHT: 121 LBS | DIASTOLIC BLOOD PRESSURE: 80 MMHG | TEMPERATURE: 97.3 F | HEIGHT: 64 IN | RESPIRATION RATE: 19 BRPM

## 2022-11-04 DIAGNOSIS — R22.42 MASS OF LEFT LOWER LEG: ICD-10-CM

## 2022-11-04 PROCEDURE — 7100000000 HC PACU RECOVERY - FIRST 15 MIN: Performed by: PLASTIC SURGERY

## 2022-11-04 PROCEDURE — 2580000003 HC RX 258: Performed by: ANESTHESIOLOGY

## 2022-11-04 PROCEDURE — 7100000001 HC PACU RECOVERY - ADDTL 15 MIN: Performed by: PLASTIC SURGERY

## 2022-11-04 PROCEDURE — 88341 IMHCHEM/IMCYTCHM EA ADD ANTB: CPT

## 2022-11-04 PROCEDURE — 2500000003 HC RX 250 WO HCPCS: Performed by: PLASTIC SURGERY

## 2022-11-04 PROCEDURE — 7100000010 HC PHASE II RECOVERY - FIRST 15 MIN: Performed by: PLASTIC SURGERY

## 2022-11-04 PROCEDURE — 3700000000 HC ANESTHESIA ATTENDED CARE: Performed by: PLASTIC SURGERY

## 2022-11-04 PROCEDURE — 6360000002 HC RX W HCPCS: Performed by: NURSE ANESTHETIST, CERTIFIED REGISTERED

## 2022-11-04 PROCEDURE — 2709999900 HC NON-CHARGEABLE SUPPLY: Performed by: PLASTIC SURGERY

## 2022-11-04 PROCEDURE — 3600000012 HC SURGERY LEVEL 2 ADDTL 15MIN: Performed by: PLASTIC SURGERY

## 2022-11-04 PROCEDURE — 3600000002 HC SURGERY LEVEL 2 BASE: Performed by: PLASTIC SURGERY

## 2022-11-04 PROCEDURE — 88342 IMHCHEM/IMCYTCHM 1ST ANTB: CPT

## 2022-11-04 PROCEDURE — 88305 TISSUE EXAM BY PATHOLOGIST: CPT

## 2022-11-04 PROCEDURE — 3700000001 HC ADD 15 MINUTES (ANESTHESIA): Performed by: PLASTIC SURGERY

## 2022-11-04 PROCEDURE — 7100000011 HC PHASE II RECOVERY - ADDTL 15 MIN: Performed by: PLASTIC SURGERY

## 2022-11-04 RX ORDER — SODIUM CHLORIDE 0.9 % (FLUSH) 0.9 %
5-40 SYRINGE (ML) INJECTION EVERY 12 HOURS SCHEDULED
Status: DISCONTINUED | OUTPATIENT
Start: 2022-11-04 | End: 2022-11-04 | Stop reason: HOSPADM

## 2022-11-04 RX ORDER — BUPIVACAINE HYDROCHLORIDE AND EPINEPHRINE 5; 5 MG/ML; UG/ML
INJECTION, SOLUTION EPIDURAL; INTRACAUDAL; PERINEURAL PRN
Status: DISCONTINUED | OUTPATIENT
Start: 2022-11-04 | End: 2022-11-04 | Stop reason: ALTCHOICE

## 2022-11-04 RX ORDER — SODIUM CHLORIDE 9 MG/ML
INJECTION, SOLUTION INTRAVENOUS PRN
Status: DISCONTINUED | OUTPATIENT
Start: 2022-11-04 | End: 2022-11-04 | Stop reason: HOSPADM

## 2022-11-04 RX ORDER — HYDROCODONE BITARTRATE AND ACETAMINOPHEN 5; 325 MG/1; MG/1
1 TABLET ORAL EVERY 6 HOURS PRN
Qty: 12 TABLET | Refills: 0 | Status: SHIPPED | OUTPATIENT
Start: 2022-11-04 | End: 2022-11-07

## 2022-11-04 RX ORDER — MEPERIDINE HYDROCHLORIDE 50 MG/ML
12.5 INJECTION INTRAMUSCULAR; INTRAVENOUS; SUBCUTANEOUS EVERY 5 MIN PRN
Status: DISCONTINUED | OUTPATIENT
Start: 2022-11-04 | End: 2022-11-04 | Stop reason: HOSPADM

## 2022-11-04 RX ORDER — ONDANSETRON 2 MG/ML
4 INJECTION INTRAMUSCULAR; INTRAVENOUS
Status: DISCONTINUED | OUTPATIENT
Start: 2022-11-04 | End: 2022-11-04 | Stop reason: HOSPADM

## 2022-11-04 RX ORDER — OXYCODONE HYDROCHLORIDE AND ACETAMINOPHEN 5; 325 MG/1; MG/1
2 TABLET ORAL
Status: DISCONTINUED | OUTPATIENT
Start: 2022-11-04 | End: 2022-11-04 | Stop reason: HOSPADM

## 2022-11-04 RX ORDER — PROMETHAZINE HYDROCHLORIDE 25 MG/ML
6.25 INJECTION, SOLUTION INTRAMUSCULAR; INTRAVENOUS EVERY 5 MIN PRN
Status: DISCONTINUED | OUTPATIENT
Start: 2022-11-04 | End: 2022-11-04 | Stop reason: HOSPADM

## 2022-11-04 RX ORDER — SODIUM CHLORIDE 0.9 % (FLUSH) 0.9 %
5-40 SYRINGE (ML) INJECTION PRN
Status: DISCONTINUED | OUTPATIENT
Start: 2022-11-04 | End: 2022-11-04 | Stop reason: HOSPADM

## 2022-11-04 RX ORDER — MIDAZOLAM HYDROCHLORIDE 1 MG/ML
INJECTION INTRAMUSCULAR; INTRAVENOUS PRN
Status: DISCONTINUED | OUTPATIENT
Start: 2022-11-04 | End: 2022-11-04 | Stop reason: SDUPTHER

## 2022-11-04 RX ORDER — CEPHALEXIN 500 MG/1
500 CAPSULE ORAL 3 TIMES DAILY
Qty: 15 CAPSULE | Refills: 0 | Status: SHIPPED | OUTPATIENT
Start: 2022-11-04 | End: 2022-11-09

## 2022-11-04 RX ORDER — SODIUM CHLORIDE, SODIUM LACTATE, POTASSIUM CHLORIDE, CALCIUM CHLORIDE 600; 310; 30; 20 MG/100ML; MG/100ML; MG/100ML; MG/100ML
INJECTION, SOLUTION INTRAVENOUS CONTINUOUS
Status: DISCONTINUED | OUTPATIENT
Start: 2022-11-04 | End: 2022-11-04 | Stop reason: HOSPADM

## 2022-11-04 RX ORDER — MIDAZOLAM HYDROCHLORIDE 2 MG/2ML
2 INJECTION, SOLUTION INTRAMUSCULAR; INTRAVENOUS
Status: DISCONTINUED | OUTPATIENT
Start: 2022-11-04 | End: 2022-11-04 | Stop reason: HOSPADM

## 2022-11-04 RX ORDER — DIPHENHYDRAMINE HYDROCHLORIDE 50 MG/ML
12.5 INJECTION INTRAMUSCULAR; INTRAVENOUS
Status: DISCONTINUED | OUTPATIENT
Start: 2022-11-04 | End: 2022-11-04 | Stop reason: HOSPADM

## 2022-11-04 RX ORDER — MORPHINE SULFATE 2 MG/ML
2 INJECTION, SOLUTION INTRAMUSCULAR; INTRAVENOUS EVERY 5 MIN PRN
Status: DISCONTINUED | OUTPATIENT
Start: 2022-11-04 | End: 2022-11-04 | Stop reason: HOSPADM

## 2022-11-04 RX ORDER — CEFAZOLIN SODIUM 2 G/50ML
SOLUTION INTRAVENOUS PRN
Status: DISCONTINUED | OUTPATIENT
Start: 2022-11-04 | End: 2022-11-04 | Stop reason: SDUPTHER

## 2022-11-04 RX ORDER — OXYCODONE HYDROCHLORIDE AND ACETAMINOPHEN 5; 325 MG/1; MG/1
1 TABLET ORAL
Status: DISCONTINUED | OUTPATIENT
Start: 2022-11-04 | End: 2022-11-04 | Stop reason: HOSPADM

## 2022-11-04 RX ORDER — FENTANYL CITRATE 50 UG/ML
INJECTION, SOLUTION INTRAMUSCULAR; INTRAVENOUS PRN
Status: DISCONTINUED | OUTPATIENT
Start: 2022-11-04 | End: 2022-11-04 | Stop reason: SDUPTHER

## 2022-11-04 RX ORDER — IPRATROPIUM BROMIDE AND ALBUTEROL SULFATE 2.5; .5 MG/3ML; MG/3ML
1 SOLUTION RESPIRATORY (INHALATION)
Status: DISCONTINUED | OUTPATIENT
Start: 2022-11-04 | End: 2022-11-04 | Stop reason: HOSPADM

## 2022-11-04 RX ORDER — LIDOCAINE HYDROCHLORIDE 10 MG/ML
1 INJECTION, SOLUTION EPIDURAL; INFILTRATION; INTRACAUDAL; PERINEURAL
Status: DISCONTINUED | OUTPATIENT
Start: 2022-11-04 | End: 2022-11-04 | Stop reason: HOSPADM

## 2022-11-04 RX ORDER — PROPOFOL 10 MG/ML
INJECTION, EMULSION INTRAVENOUS PRN
Status: DISCONTINUED | OUTPATIENT
Start: 2022-11-04 | End: 2022-11-04 | Stop reason: SDUPTHER

## 2022-11-04 RX ORDER — LABETALOL HYDROCHLORIDE 5 MG/ML
10 INJECTION, SOLUTION INTRAVENOUS
Status: DISCONTINUED | OUTPATIENT
Start: 2022-11-04 | End: 2022-11-04 | Stop reason: HOSPADM

## 2022-11-04 RX ORDER — GLYCOPYRROLATE 0.2 MG/ML
0.4 INJECTION INTRAMUSCULAR; INTRAVENOUS ONCE
Status: DISCONTINUED | OUTPATIENT
Start: 2022-11-04 | End: 2022-11-04 | Stop reason: HOSPADM

## 2022-11-04 RX ORDER — CEFAZOLIN 2 G/1
INJECTION, POWDER, FOR SOLUTION INTRAMUSCULAR; INTRAVENOUS
Status: DISCONTINUED
Start: 2022-11-04 | End: 2022-11-04 | Stop reason: HOSPADM

## 2022-11-04 RX ORDER — ONDANSETRON 2 MG/ML
INJECTION INTRAMUSCULAR; INTRAVENOUS PRN
Status: DISCONTINUED | OUTPATIENT
Start: 2022-11-04 | End: 2022-11-04 | Stop reason: SDUPTHER

## 2022-11-04 RX ORDER — SODIUM CHLORIDE 9 MG/ML
25 INJECTION, SOLUTION INTRAVENOUS PRN
Status: DISCONTINUED | OUTPATIENT
Start: 2022-11-04 | End: 2022-11-04 | Stop reason: HOSPADM

## 2022-11-04 RX ADMIN — ONDANSETRON 4 MG: 2 INJECTION INTRAMUSCULAR; INTRAVENOUS at 12:22

## 2022-11-04 RX ADMIN — FENTANYL CITRATE 100 MCG: 50 INJECTION, SOLUTION INTRAMUSCULAR; INTRAVENOUS at 12:17

## 2022-11-04 RX ADMIN — MIDAZOLAM 2 MG: 1 INJECTION INTRAMUSCULAR; INTRAVENOUS at 12:15

## 2022-11-04 RX ADMIN — SODIUM CHLORIDE, POTASSIUM CHLORIDE, SODIUM LACTATE AND CALCIUM CHLORIDE: 600; 310; 30; 20 INJECTION, SOLUTION INTRAVENOUS at 10:39

## 2022-11-04 RX ADMIN — CEFAZOLIN SODIUM 2000 MG: 2 SOLUTION INTRAVENOUS at 12:22

## 2022-11-04 RX ADMIN — PROPOFOL 200 MG: 10 INJECTION, EMULSION INTRAVENOUS at 12:17

## 2022-11-04 ASSESSMENT — PAIN - FUNCTIONAL ASSESSMENT: PAIN_FUNCTIONAL_ASSESSMENT: NONE - DENIES PAIN

## 2022-11-04 NOTE — ANESTHESIA POSTPROCEDURE EVALUATION
Department of Anesthesiology  Postprocedure Note    Patient: Huey Gautam  MRN: [id number]  YOB: 1980  Date of evaluation: 11/4/2022      Procedure Summary     Date: 11/04/22 Room / Location: [address]) [facility]    Anesthesia Start: 7383 Anesthesia Stop: 5044    Procedure: LEFT ANTERIOR SHIN LESION EXCISION (Left) Diagnosis:       Mass of left lower leg      (Mass of left lower leg [R22.42])    Surgeons: Marcell Shannon MD Responsible Provider: Chucky Dan MD    Anesthesia Type: general ASA Status: 1          Anesthesia Type: No value filed.     Edward Phase I: Edward Score: 4    Edward Phase II:        Anesthesia Post Evaluation    Patient location during evaluation: PACU  Patient participation: complete - patient participated  Level of consciousness: awake and alert  Airway patency: patent  Nausea & Vomiting: no nausea and no vomiting  Complications: no  Cardiovascular status: hemodynamically stable  Respiratory status: room air and spontaneous ventilation  Hydration status: euvolemic  Multimodal analgesia pain management approach

## 2022-11-04 NOTE — ANESTHESIA PRE PROCEDURE
Department of Anesthesiology  Preprocedure Note       Name:  Aimee Pillai   Age:  36 y.o.  :  1980                                          MRN:  [id number]         Date:  2022      Surgeon: Melly Webb):  Shital Aldridge MD    Procedure: Procedure(s):  LEFT LEG SHIN MASS LESION BIOPSY EXCISION    Medications prior to admission:   Prior to Admission medications    Medication Sig Start Date End Date Taking? Authorizing Provider   ibuprofen (ADVIL;MOTRIN) 200 MG tablet Take 200 mg by mouth every 6 hours as needed for Pain   Yes Historical Provider, MD   nitrofurantoin, macrocrystal-monohydrate, (MACROBID) 100 MG capsule Take 100 mg by mouth Once a week. Yes Historical Provider, MD   spironolactone (ALDACTONE) 50 MG tablet take 1 tablet by mouth twice a day 22   Historical Provider, MD       Current medications:    Current Facility-Administered Medications   Medication Dose Route Frequency Provider Last Rate Last Admin    lidocaine PF 1 % injection 1 mL  1 mL IntraDERmal Once PRN Mali Chang MD        lactated ringers infusion   IntraVENous Continuous Mali Chang  mL/hr at 22 1039 New Bag at 22 1039    sodium chloride flush 0.9 % injection 5-40 mL  5-40 mL IntraVENous 2 times per day Mali Chang MD        sodium chloride flush 0.9 % injection 5-40 mL  5-40 mL IntraVENous PRN Mali Chang MD        0.9 % sodium chloride infusion   IntraVENous PRN Mali Chang MD        ceFAZolin (ANCEF) 2 g injection                Allergies: Allergies   Allergen Reactions    Bactrim     Pcn [Penicillins]     Sulfa Antibiotics     Solosec [Secnidazole] Nausea And Vomiting     Made her very sick to her stomach       Problem List:    Patient Active Problem List   Diagnosis Code    Breast lump N63.0       Past Medical History:        Diagnosis Date    MDRO (multiple drug resistant organisms) resistance 2014    E.  Coli urine       Past Surgical 11:25 AM       POC Tests: No results for input(s): POCGLU, POCNA, POCK, POCCL, POCBUN, POCHEMO, POCHCT in the last 72 hours. Coags: No results found for: PROTIME, INR, APTT    HCG (If Applicable):   Lab Results   Component Value Date    PREGTESTUR negative 01/02/2018        ABGs: No results found for: PHART, PO2ART, UOT6JUL, RVN6XKM, BEART, B6KHZAPN     Type & Screen (If Applicable):  No results found for: LABABO, LABRH    Drug/Infectious Status (If Applicable):  No results found for: HIV, HEPCAB    COVID-19 Screening (If Applicable): No results found for: COVID19        Anesthesia Evaluation  Patient summary reviewed and Nursing notes reviewed  Airway: Mallampati: I  TM distance: >3 FB   Neck ROM: full  Mouth opening: > = 3 FB   Dental: normal exam         Pulmonary:Negative Pulmonary ROS and normal exam                               Cardiovascular:Negative CV ROS                      Neuro/Psych:   Negative Neuro/Psych ROS              GI/Hepatic/Renal: Neg GI/Hepatic/Renal ROS            Endo/Other: Negative Endo/Other ROS                     ROS comment: -NPO AFTER MIDNIGHT  -ALLERGIES - BACTRIM, PCN, SULFA, SOLOSEC Abdominal:             Vascular: negative vascular ROS. Other Findings:           Anesthesia Plan      general     ASA 1     (LMA)  Induction: intravenous. MIPS: Postoperative opioids intended and Prophylactic antiemetics administered. Anesthetic plan and risks discussed with patient. Plan discussed with CRNA.     Attending anesthesiologist reviewed and agrees with Jameson Mooney MD   11/4/2022

## 2022-11-04 NOTE — H&P
HPI: Pt is a 36 y.o. female who presents to the office today for a mass on her left lower leg that was discovered sometime ago. She states that it has become larger and started itching since the biopsy at Yvette's office. She had a shave biopsy in the past with Sara Witt that revealed a dermatofibroma. After the healing process the lesion began growing becoming darker, more exophytic, and painful. She denies a family or personal history of skin cancer but she does know that her mother has had several precancerous lesions removed. Past Medical History        Past Medical History:   Diagnosis Date    MDRO (multiple drug resistant organisms) resistance 2014     E. Coli urine            Past Surgical History         Past Surgical History:   Procedure Laterality Date     SECTION        VARICOSE VEIN SURGERY Bilateral 2015            Current Facility-Administered Medications          Current Outpatient Medications   Medication Sig Dispense Refill    spironolactone (ALDACTONE) 50 MG tablet take 1 tablet by mouth twice a day        spironolactone (ALDACTONE) 100 MG tablet Take 100 mg by mouth daily. No current facility-administered medications for this visit.             Family History         Family History   Problem Relation Age of Onset    Heart Attack Paternal Grandfather      Cancer Paternal Grandmother           lymphoma    High Blood Pressure Maternal Grandmother      Heart Disease Maternal Grandfather      Diabetes Mother      Heart Disease Mother      No Known Problems Father      No Known Problems Brother              Social History               Socioeconomic History    Marital status:        Spouse name: Not on file    Number of children: Not on file    Years of education: Not on file    Highest education level: Not on file   Occupational History    Not on file   Tobacco Use    Smoking status: Never Smoker    Smokeless tobacco: Never Used   Substance and Sexual Activity    Alcohol use: No    Drug use: No    Sexual activity: Yes       Partners: Male   Other Topics Concern    Not on file   Social History Narrative    Not on file      Social Determinants of Health          Financial Resource Strain:     Difficulty of Paying Living Expenses: Not on file   Food Insecurity:     Worried About [address] in the Last Year: Not on file    Ran Out of Food in the Last Year: Not on file   Transportation Needs:     Lack of Transportation (Medical): Not on file    Lack of Transportation (Non-Medical): Not on file   Physical Activity:     Days of Exercise per Week: Not on file    Minutes of Exercise per Session: Not on file   Stress:     Feeling of Stress : Not on file   Social Connections:     Frequency of Communication with Friends and Family: Not on file    Frequency of Social Gatherings with Friends and Family: Not on file    Attends Worship Services: Not on file    Active Member of Clubs or Organizations: Not on file    Attends Club or Organization Meetings: Not on file    Marital Status: Not on file   Intimate Partner Violence:     Fear of Current or Ex-Partner: Not on file    Emotionally Abused: Not on file    Physically Abused: Not on file    Sexually Abused: Not on file   Housing Stability:     Unable to Pay for Housing in the Last Year: Not on file    Number of [place] in the Last Year: Not on file    Unstable Housing in the Last Year: Not on file               ROS:  All systems reviewed. Denies chest pain, shortness of breath, abdominal pain, n/v/d/c. Denies rashes, any upper respiratory illness or fever / chills. Denies dizziness, headaches, tremor. Negative other than those noted above. Allergies   Allergen Reactions    Bactrim      Pcn [Penicillins]      Sulfa Antibiotics      Solosec [Secnidazole] Nausea And Vomiting       Made her very sick to her stomach         Physical Exam:  VITALS:  height is 5' 2\" (1.575 m) and weight is 120 lb (54.4 kg).  Her respiration is 16.   CONSTITUTIONAL:alert & orientated x 3, no acute distress   SKIN:  Warm and dry, no rashes. Left lower anterior shin      See assessment below  HEAD:  Normocephalic, atraumatic  EYES: PERRL. EOMs intact. EARS:  Hearing grossly WNL. NOSE:  Nares patent. No rhinorrhea  THROAT:  benign  NECK:supple, no lymphadenopathy  LUNGS: No audible adventitious lung sounds. Patient is breathing normally without issues speaking in full sentences. CARDIOVASCULAR: Heart sounds are normal. Pulses equal bilaterally. Skin color appropriate throughout. Regular rate and rhythm without murmur, gallop or rub. ABDOMEN: soft, non tender, non distended, no masses or organomegaly   EXTREMITIES: no edema bilateral lower extremities         Assessment:  1. Diagnosis Orders   1. Dermatofibroma            Plan: At this time We discussed possible pathology at length and she would like to move forward with excision  Self skin checks encouraged, patient advised to watch all existing lesions for changes and observe for any new lesions. Return to the office for evaluation of any concerning, changing or new skin lesions. 3.  Consistent use of sunscreen containing SPF 30 or higher and reapplication every 90 - 571 minutes while outside. 4.  The patient's was evaluated and after discussion surgical and non surgical options, the patient has decided to undergo surgical removal of the mass. All questions were answered to the patient's satisfaction. We will get her scheduled for excision under MAC anesthesia. The patient will call Lizette to schedule the procedure. If there are any further questions or concerns, the patient was encouraged to call and follow up with one of the providers. H & P reviewed.  The Patient was examined and there are no changes to the H & P

## 2022-11-08 LAB — DERMATOLOGY PATHOLOGY REPORT: NORMAL

## 2023-02-02 ENCOUNTER — OFFICE VISIT (OUTPATIENT)
Dept: OBGYN CLINIC | Age: 43
End: 2023-02-02
Payer: COMMERCIAL

## 2023-02-02 ENCOUNTER — HOSPITAL ENCOUNTER (OUTPATIENT)
Age: 43
Setting detail: SPECIMEN
Discharge: HOME | End: 2023-02-02

## 2023-02-02 VITALS
HEIGHT: 64 IN | WEIGHT: 117 LBS | SYSTOLIC BLOOD PRESSURE: 120 MMHG | BODY MASS INDEX: 19.97 KG/M2 | DIASTOLIC BLOOD PRESSURE: 72 MMHG

## 2023-02-02 DIAGNOSIS — Z12.31 VISIT FOR SCREENING MAMMOGRAM: ICD-10-CM

## 2023-02-02 DIAGNOSIS — N92.6 IRREGULAR MENSES: ICD-10-CM

## 2023-02-02 DIAGNOSIS — Z01.419 ENCOUNTER FOR ANNUAL ROUTINE GYNECOLOGICAL EXAMINATION: Primary | ICD-10-CM

## 2023-02-02 PROCEDURE — 99396 PREV VISIT EST AGE 40-64: CPT | Performed by: NURSE PRACTITIONER

## 2023-02-02 ASSESSMENT — ENCOUNTER SYMPTOMS
COLOR CHANGE: 0
ABDOMINAL PAIN: 0
SHORTNESS OF BREATH: 0
COUGH: 0
VOMITING: 0
NAUSEA: 0

## 2023-02-02 NOTE — PROGRESS NOTES
Lambert Green is a 39 y.o.  here for her annual exam.  The patient was seen and examined. The patients past medical, surgical, social and family history were reviewed. Current medications and allergies were reviewed, and documented in the chart. Has had 3 children- 1 girl and 2 boys. She is . 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 Exercise Yes  Diet Yes  Tobacco abuse No     Last PAP: August 2021- negative hx of abnormal PAP yes - years ago, states repeat was normal  Family hx uterine or ovarian cancer-denies  Last mammogram- August 2022 for breast lumo    EXAMINATION:   DIAGNOSTIC DIGITAL LEFT BREAST MAMMOGRAM WITH TOMOSYNTHESIS; TARGETED   ULTRASOUND OF THE LEFT BREAST, 8/9/2022 8:57 am       TECHNIQUE:   Diagnostic mammography of the left breast was performed with    tomosynthesis. 2D standard and 3D tomosynthesis combination imaging performed through the   left breast.  Computer aided detection was utilized in the interpretation    of   this exam.; Target ultrasound of the left breast was performed. Views: CC and MLO views of the left breast.  3D tomosynthesis was    performed. COMPARISON:   Mammogram dated 04/12/2022, 11/16/2020, 07/09/2019, 05/08/2018, 04/26/2017       HISTORY:   ORDERING SYSTEM PROVIDED HISTORY: Left breast lump   TECHNOLOGIST PROVIDED HISTORY:   Is the patient pregnant?->No       FINDINGS:   Mammogram: The breasts are extremely dense which lowers the sensitivity of   mammography. There are multiple oval and round circumscribed masses in    the   left breast.  One of these appears to correspond with the palpable lump in   the left breast.  No suspicious mass, architectural distortion, or   calcification. Limited left breast ultrasound: Targeted ultrasound imaging was performed    at   the palpable lump in the left breast.  In the 11 o'clock left breast, 6 cm   from the nipple, there is a simple cyst measuring 1.6 x 1.0 x 1.4 cm.      There   is surrounding dense fibroglandular tissue. No evidence of solid mass. No   left axillary lymphadenopathy. Impression   Palpable lump in the 11 o'clock left breast corresponds to a 1.6 cm simple   cyst.  No suspicious findings on diagnostic mammogram or limited left    breast   ultrasound. The patient can return to annual screening mammogram in 2023. BIRADS 2       BIRADS:   BIRADS - CATEGORY 2       Benign Findings. Bilateral screening mammogram is recommended in 2023. OVERALL ASSESSMENT - BENIGN       A letter of notification will be sent to the patient regarding the    results. The [employer] [facility] [employer] recommends annual mammograms for women    40   years and older. Sexually active: yes - with , multiple partners: No, Dyspareunia: No, Vaginal discharge: no,  UTI symptoms: no, voiding difficulties: no, bowels regular:Yes bloating:no        Menstrual history:  Menarche age- 14, cycle every month but states can be 21 to 28 days apart and last 3-4 days or 9 days denies being heavy or significant cramping. Denies hot flashes or vaginal dryness. Denies vaginal d/c odor or itching, denies medication changes or significant stressors, weight changes.    Birth control: vasectomy  LMP: 22    OB History    Para Term  AB Living   3 3 3     3   SAB IAB Ectopic Molar Multiple Live Births             3      # Outcome Date GA Lbr Ollie/2nd Weight Sex Delivery Anes PTL Lv   3 Term            2 Term      CS-LTranv   DEEPAK   1 Term                Vitals:    23 1341   BP: 120/72   Site: Right Upper Arm   Position: Sitting   Cuff Size: Small Adult   Weight: 117 lb (53.1 kg)   Height: 5' 4\" (1.626 m)       Wt Readings from Last 3 Encounters:   23 117 lb (53.1 kg)   22 120 lb (54.4 kg)   22 121 lb (54.9 kg)     Past Medical History:   Diagnosis Date    MDRO (multiple drug resistant organisms) resistance 2014    ONEIL Coli urine                                                                   Past Surgical History:   Procedure Laterality Date     SECTION      LEG BIOPSY EXCISION Left 2022    LEFT ANTERIOR SHIN LESION EXCISION performed by Randall Sewell MD at [facility] [address]    PRE-MALIGNANT / BENIGN SKIN LESION EXCISION Left 2022    LEFT LEG SHIN MASS LESION BIOPSY EXCISION (Left)    VARICOSE VEIN SURGERY Bilateral      Family History   Problem Relation Age of Onset    Heart Attack Paternal Grandfather     Cancer Paternal Grandmother         lymphoma    High Blood Pressure Maternal Grandmother     Heart Disease Maternal Grandfather     Diabetes Mother     Heart Disease Mother     No Known Problems Father     No Known Problems Brother      Social History     Tobacco Use   Smoking Status Never   Smokeless Tobacco Never     Social History     Substance and Sexual Activity   Alcohol Use Yes    Comment: social        Social History       Tobacco History       Smoking Status  Never      Smokeless Tobacco Use  Never              Alcohol History       Alcohol Use Status  Yes Comment  social              Drug Use       Drug Use Status  No              Sexual Activity       Sexually Active  Yes Partners  Male                  Allergies   Allergen Reactions    Bactrim     Pcn [Penicillins]     Sulfa Antibiotics     Solosec [Secnidazole] Nausea And Vomiting     Made her very sick to her stomach     Current Outpatient Medications   Medication Sig Dispense Refill    ibuprofen (ADVIL;MOTRIN) 200 MG tablet Take 200 mg by mouth every 6 hours as needed for Pain      spironolactone (ALDACTONE) 50 MG tablet take 1 tablet by mouth twice a day       No current facility-administered medications for this visit. Subjective:     Review of Systems   Constitutional:  Negative for chills and fever. Respiratory:  Negative for cough and shortness of breath.     Cardiovascular:  Negative for chest pain, palpitations and leg swelling. Gastrointestinal:  Negative for abdominal pain, nausea and vomiting. Genitourinary:  Positive for menstrual problem. Negative for dyspareunia, dysuria, flank pain, pelvic pain, vaginal bleeding, vaginal discharge and vaginal pain. Skin:  Negative for color change and rash. Neurological:  Negative for dizziness, light-headedness and headaches. Hematological:  Negative for adenopathy. Does not bruise/bleed easily. Psychiatric/Behavioral:  Negative for self-injury and suicidal ideas. Objective:     Physical Exam  Vitals and nursing note reviewed. Constitutional:       General: She is not in acute distress. Appearance: She is well-developed. She is not diaphoretic. HENT:      Head: Normocephalic and atraumatic. Right Ear: External ear normal.      Left Ear: External ear normal.      Nose: Nose normal.   Eyes:      Pupils: Pupils are equal, round, and reactive to light. Neck:      Thyroid: No thyromegaly. Cardiovascular:      Rate and Rhythm: Normal rate and regular rhythm. Heart sounds: Normal heart sounds. No murmur heard. No friction rub. No gallop. Comments: No bilateral calf tenderness or swelling  Pulmonary:      Effort: Pulmonary effort is normal. No respiratory distress. Breath sounds: Normal breath sounds. No wheezing. Abdominal:      General: Bowel sounds are normal.      Palpations: Abdomen is soft. Tenderness: There is no abdominal tenderness. Genitourinary:     Comments: Breasts nipples everted, no masses or tenderness right breast, left breast mobile mass left breast lateral no tenderness- hx breast cyst as noted , does BSE  Vulva-no lesions  Vagina-pink rugated  Cervix-firm, Nontender, freely movable, no lesions  Uterus-Smooth, firm, nontender, freely movable  Adnexa-no masses or tenderness   Musculoskeletal:         General: Normal range of motion. Cervical back: Normal range of motion and neck supple.    Lymphadenopathy: Cervical: No cervical adenopathy. Skin:     General: Skin is warm and dry. Findings: No rash. Neurological:      Mental Status: She is alert and oriented to person, place, and time. Cranial Nerves: No cranial nerve deficit. Deep Tendon Reflexes: Reflexes are normal and symmetric. Psychiatric:         Behavior: Behavior normal.         Thought Content: Thought content normal.         Judgment: Judgment normal.     /72 (Site: Right Upper Arm, Position: Sitting, Cuff Size: Small Adult)   Ht 5' 4\" (1.626 m)   Wt 117 lb (53.1 kg)   LMP 01/24/2023   BMI 20.08 kg/m²     Assessment:       Diagnosis Orders   1. Encounter for annual routine gynecological examination  PAP SMEAR      2. Visit for screening mammogram  OBEY DIGITAL SCREEN W OR WO CAD BILATERAL      3. Irregular menses  CBC with Auto Differential    TSH with Reflex    HCG, Quantitative, Pregnancy    US NON OB TRANSVAGINAL    Prolactin    Follicle Stimulating Hormone    US PELVIS COMPLETE          Breast exam completed. Pelvic exam pap smear collected and sent. Cultures sent No    Plan:   Collect pap   BSE reviewed, Mammogram ordered: yes  STD prevention reviewed  Gardisil counseling completed for all patients 10-37 yo  Irregular menses:   She is not sure she wants to do anything about them because stil light and monthly but willing to get labs and pelvic US, discussed possible need for endometrial biopsy also. Cultures declined   Blood work and pelvic US ordered    Diet & Exercise reviewed with pt. Preventive  Health through PCP   RV 4 weeks after lab and US          Orders Placed This Encounter   Procedures    OBEY DIGITAL SCREEN W OR WO CAD BILATERAL     Standing Status:   Future     Standing Expiration Date:   2/2/2024     Order Specific Question:   Reason for exam:     Answer:   SCREENING-PREVENTATIVE    US NON OB TRANSVAGINAL     Begin with transabdominal imaging.      Standing Status:   Future     Standing Expiration Date:   2024     Order Specific Question:   Reason for exam:     Answer:   abnormal bleeding    US PELVIS COMPLETE     This procedure can be scheduled via [facility]. Access your [facility] account by visiting [email or web address]. Standing Status:   Future     Standing Expiration Date:   2024    PAP SMEAR     Patient History:    No LMP recorded. OBGYN Status: Having periods  Past Surgical History:  No date:  SECTION  2022: LEG BIOPSY EXCISION; Left      Comment:  LEFT ANTERIOR SHIN LESION EXCISION performed by Min Lance MD at [facility] [address]  2022: PRE-MALIGNANT / [address]; Left      Comment:  LEFT LEG SHIN MASS LESION BIOPSY EXCISION (Left)  2015: VARICOSE VEIN SURGERY; Bilateral      Social History    Tobacco Use      Smoking status: Never      Smokeless tobacco: Never       Standing Status:   Future     Standing Expiration Date:   2/3/2024     Order Specific Question:   Collection Type     Answer: Thin Prep     Order Specific Question:   Prior Abnormal Pap Test     Answer:   No     Order Specific Question:   Screening or Diagnostic     Answer:   Screening     Order Specific Question:   HPV Requested? Answer:   Yes     Order Specific Question:   High Risk Patient     Answer:   N/A    CBC with Auto Differential     Standing Status:   Future     Standing Expiration Date:   2024    TSH with Reflex     Standing Status:   Future     Standing Expiration Date:   2024    HCG, Quantitative, Pregnancy     Standing Status:   Future     Standing Expiration Date:   2024    Prolactin     Standing Status:   Future     Standing Expiration Date:   3176    Follicle Stimulating Hormone     Standing Status:   Future     Standing Expiration Date:   2024     No orders of the defined types were placed in this encounter. Patient given educational materials - seepatient instructions.   Discussed use, benefit, and side effects of prescribed medications. All patient questions answered. Pt voiced understanding. Reviewed health maintenance. Instructed to continue current medications, diet and exercise. Patient agreedwith treatment plan. Follow up as directed.       Electronically signed by CAMILLA Drew CNP on 2/2/2023at 2:06 PM

## 2023-08-09 ENCOUNTER — HOSPITAL ENCOUNTER (OUTPATIENT)
Dept: WOMENS IMAGING | Age: 43
Discharge: HOME | End: 2023-08-11
Payer: COMMERCIAL

## 2023-08-09 VITALS — HEIGHT: 62 IN | WEIGHT: 120 LBS | BODY MASS INDEX: 22.08 KG/M2

## 2023-08-09 DIAGNOSIS — Z12.31 VISIT FOR SCREENING MAMMOGRAM: ICD-10-CM

## 2023-08-09 PROCEDURE — 77063 BREAST TOMOSYNTHESIS BI: CPT

## 2023-08-13 DIAGNOSIS — R92.8 ABNORMAL MAMMOGRAM OF BOTH BREASTS: Primary | ICD-10-CM

## 2023-09-07 ENCOUNTER — HOSPITAL ENCOUNTER (OUTPATIENT)
Dept: WOMENS IMAGING | Age: 43
Discharge: HOME | End: 2023-09-09
Payer: COMMERCIAL

## 2023-09-07 DIAGNOSIS — R92.8 ABNORMAL MAMMOGRAM OF BOTH BREASTS: ICD-10-CM

## 2023-09-07 PROCEDURE — 76642 ULTRASOUND BREAST LIMITED: CPT

## 2023-09-08 ENCOUNTER — TELEPHONE (OUTPATIENT)
Dept: OBGYN CLINIC | Age: 43
End: 2023-09-08

## 2023-09-08 NOTE — TELEPHONE ENCOUNTER
----- Message from CAMILLA Culp CNP sent at 9/8/2023 11:17 AM EDT -----  Breast Ultrasounds revealed bilateral breast cyst benign findings, radiology recommends 1 year follow up mammogram.     If bothersome we can refer her to breast surgeon Dr. John Paul Gracia to discuss further

## 2024-03-06 ENCOUNTER — OFFICE VISIT (OUTPATIENT)
Dept: OBGYN CLINIC | Age: 44
End: 2024-03-06
Payer: COMMERCIAL

## 2024-03-06 ENCOUNTER — HOSPITAL ENCOUNTER (OUTPATIENT)
Age: 44
Setting detail: SPECIMEN
Discharge: HOME | End: 2024-03-06

## 2024-03-06 VITALS
BODY MASS INDEX: 20.49 KG/M2 | HEIGHT: 64 IN | DIASTOLIC BLOOD PRESSURE: 60 MMHG | SYSTOLIC BLOOD PRESSURE: 120 MMHG | WEIGHT: 120 LBS

## 2024-03-06 DIAGNOSIS — Z01.419 ENCOUNTER FOR ANNUAL ROUTINE GYNECOLOGICAL EXAMINATION: Primary | ICD-10-CM

## 2024-03-06 DIAGNOSIS — Z12.31 VISIT FOR SCREENING MAMMOGRAM: ICD-10-CM

## 2024-03-06 PROCEDURE — 99396 PREV VISIT EST AGE 40-64: CPT | Performed by: NURSE PRACTITIONER

## 2024-03-06 RX ORDER — DICYCLOMINE HYDROCHLORIDE 10 MG/1
CAPSULE ORAL PRN
COMMUNITY
Start: 2023-11-14

## 2024-03-06 RX ORDER — M-VIT,TX,IRON,MINS/CALC/FOLIC 27MG-0.4MG
1 TABLET ORAL DAILY
COMMUNITY

## 2024-03-06 RX ORDER — BISACODYL 5 MG/1
TABLET, DELAYED RELEASE ORAL
COMMUNITY
Start: 2024-02-14

## 2024-03-06 RX ORDER — ERGOCALCIFEROL 1.25 MG/1
CAPSULE ORAL
COMMUNITY
Start: 2023-12-13

## 2024-03-06 ASSESSMENT — ENCOUNTER SYMPTOMS
COUGH: 0
SHORTNESS OF BREATH: 0
COLOR CHANGE: 0
VOMITING: 0
NAUSEA: 0

## 2024-03-06 NOTE — PROGRESS NOTES
Kelly S Kayser is a 44 y.o.  here for her annual exam.  The patient was seen and examined.The patients past medical, surgical, social and family history were reviewed.  Current medications and allergies were reviewed, and documented in the chart.      Has had 3 children- 1 girl and 2 boys. She is .  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      Exercise Yes  Diet Yes  Tobacco abuse No     Last PAP: February 2023- negative hx of abnormal PAP yes - years ago, states repeat was normal  Family hx uterine or ovarian cancer-denies,   Last mammogram-   August 2023-   9/7/2023     COMPARISON:   Mammogram of 9 August 2023     HISTORY:   ORDERING SYSTEM PROVIDED HISTORY: Abnormal mammogram of both breasts     Enlarging nodules both breasts.     FINDINGS:   Right breast: Targeted ultrasonography of the right breast 11-12 o'clock in   the retroareolar area reveals a well-demarcated anechoic structure with good   posterior acoustic enhancement compatible with a cyst of 3.71 x 0.83 x 2.89   cm.  An adjacent cyst of 0.92 x 0.48 cm is noted.     Left breast: At 11-12 o'clock in the left breast, an anechoic structure   compatible with a cyst of 3.3 x 0.93 cm is noted with adjacent cyst of 1.5 x   0.58 cm.       IMPRESSION:     Enlarging nodules in the breasts correspond to benign-appearing cysts.   Follow-up screening mammogram in 1 year is advised.     9/7/2023     COMPARISON:   Mammogram of 9 August 2023     HISTORY:   ORDERING SYSTEM PROVIDED HISTORY: Abnormal mammogram of both breasts     Enlarging nodules both breasts.     FINDINGS:   Right breast: Targeted ultrasonography of the right breast 11-12 o'clock in   the retroareolar area reveals a well-demarcated anechoic structure with good   posterior acoustic enhancement compatible with a cyst of 3.71 x 0.83 x 2.89   cm.  An adjacent cyst of 0.92 x 0.48 cm is noted.     Left breast: At 11-12 o'clock in the left breast, an anechoic structure   compatible with a cyst of 3.3

## 2024-03-08 LAB
HPV I/H RISK 4 DNA CVX QL NAA+PROBE: NOT DETECTED
HPV SAMPLE: NORMAL
HPV, INTERPRETATION: NORMAL
HPV16 DNA CVX QL NAA+PROBE: NOT DETECTED
HPV18 DNA CVX QL NAA+PROBE: NOT DETECTED
SPECIMEN DESCRIPTION: NORMAL

## 2024-03-18 LAB — CYTOLOGY REPORT: NORMAL

## 2024-06-19 ENCOUNTER — TELEPHONE (OUTPATIENT)
Dept: DERMATOLOGY | Age: 44
End: 2024-06-19

## 2024-06-19 NOTE — TELEPHONE ENCOUNTER
An Urgent Referral for the Dermatology Department was received from [facility]. The patient has an appointment scheduled for 7/29/2024.  I called the patient to offer a sooner appointment for 6/20/2024 due to a cancellation. The patient declined  the available appointment on 6/20/2024.

## 2024-08-13 ENCOUNTER — HOSPITAL ENCOUNTER (OUTPATIENT)
Dept: WOMENS IMAGING | Age: 44
Discharge: HOME | End: 2024-08-15
Payer: COMMERCIAL

## 2024-08-13 VITALS — BODY MASS INDEX: 17.93 KG/M2 | WEIGHT: 105 LBS | HEIGHT: 64 IN

## 2024-08-13 DIAGNOSIS — Z12.31 VISIT FOR SCREENING MAMMOGRAM: ICD-10-CM

## 2024-08-13 PROCEDURE — 77063 BREAST TOMOSYNTHESIS BI: CPT

## 2025-01-31 ENCOUNTER — TELEMEDICINE (OUTPATIENT)
Dept: OBGYN CLINIC | Age: 45
End: 2025-01-31

## 2025-01-31 DIAGNOSIS — R93.5 ABNORMAL CT SCAN, PELVIS: ICD-10-CM

## 2025-01-31 DIAGNOSIS — C18.9 COLON CANCER METASTASIZED TO LIVER (HCC): ICD-10-CM

## 2025-01-31 DIAGNOSIS — C78.7 COLON CANCER METASTASIZED TO LIVER (HCC): ICD-10-CM

## 2025-01-31 DIAGNOSIS — N94.89 PELVIC CYST IN FEMALE: Primary | ICD-10-CM

## 2025-01-31 DIAGNOSIS — N92.1 MENORRHAGIA WITH IRREGULAR CYCLE: ICD-10-CM

## 2025-01-31 PROCEDURE — 99213 OFFICE O/P EST LOW 20 MIN: CPT | Performed by: OBSTETRICS & GYNECOLOGY

## 2025-01-31 NOTE — PROGRESS NOTES
Kelly S Kayser  2025    Kelly S Kayser (:  1980) has requested an audio/video evaluation for the following concern(s):    1. Pelvic cyst in female    2. Abnormal CT scan, pelvis    3. Menorrhagia with irregular cycle    4. Colon cancer metastasized to liver (HCC)          TELEHEALTH EVALUATION -- Audio/Visual (During COVID-19 public health emergency)      Kelly S Kayser is a 45 y.o. female       She was here to follow-up regarding her labs and diagnostics ordered at her last visit for the diagnosis of:    ICD-10-CM    1. Pelvic cyst in female  N94.89      Inhibin A     Inhibin B     Estradiol     MISCELLANEOUS SENDOUT OVA-1     Follicle Stimulating Hormone     Luteinizing Hormone      2. Abnormal CT scan, pelvis  R93.5      Inhibin A     Inhibin B     Estradiol     MISCELLANEOUS SENDOUT OVA-1     Follicle Stimulating Hormone     Luteinizing Hormone      3. Menorrhagia with irregular cycle  N92.1       4. Colon cancer metastasized to liver (HCC)  C18.9     C78.7 Inhibin A     Inhibin B     Estradiol     MISCELLANEOUS SENDOUT OVA-1     Follicle Stimulating Hormone     Luteinizing Hormone        2024 diagnosed with colon cancer  2024 - colectomy with anastamosis, liver ablation  She then had 12 rounds of chemotherapy.      CT and MRI done showing no signs of metastatic disease, hwoever there is a 3x6cm cystic appearing area of the left pelvis that is close to suture line of sigmoid colon.  Consistent most likely with lymphcele or simple cyst.   However, given her history work up needed.      She denies pain or changes in bowel/bladder function.  She denies F/CH.  She denies any specific complaints and states this is just an incidental finding    She does have history of very irregular periods and since chemotherapy her periods are now more rare, but very heavy.  She states they are longer and heavier and will complicated her ADLs.      Past Medical History:

## 2025-02-03 ENCOUNTER — TELEPHONE (OUTPATIENT)
Dept: OBGYN CLINIC | Age: 45
End: 2025-02-03

## 2025-02-03 DIAGNOSIS — N94.9 ADNEXAL CYST: Primary | ICD-10-CM

## 2025-02-03 NOTE — TELEPHONE ENCOUNTER
Left detailed message on pt vm    Per dr eason     Follow up 2-3 months  Ultrasound ordered and left centralized scheduling number.

## 2025-02-06 ENCOUNTER — HOSPITAL ENCOUNTER (OUTPATIENT)
Dept: ULTRASOUND IMAGING | Age: 45
Discharge: HOME | End: 2025-02-08
Attending: OBSTETRICS & GYNECOLOGY
Payer: COMMERCIAL

## 2025-02-06 DIAGNOSIS — N94.9 ADNEXAL CYST: ICD-10-CM

## 2025-02-06 PROCEDURE — 76830 TRANSVAGINAL US NON-OB: CPT

## 2025-02-06 PROCEDURE — 76856 US EXAM PELVIC COMPLETE: CPT

## 2025-03-25 ENCOUNTER — HOSPITAL ENCOUNTER (OUTPATIENT)
Age: 45
Setting detail: SPECIMEN
Discharge: HOME | End: 2025-03-25

## 2025-03-25 ENCOUNTER — OFFICE VISIT (OUTPATIENT)
Dept: OBGYN CLINIC | Age: 45
End: 2025-03-25
Payer: COMMERCIAL

## 2025-03-25 VITALS
BODY MASS INDEX: 20.93 KG/M2 | SYSTOLIC BLOOD PRESSURE: 128 MMHG | HEIGHT: 64 IN | DIASTOLIC BLOOD PRESSURE: 86 MMHG | WEIGHT: 122.6 LBS

## 2025-03-25 DIAGNOSIS — N63.10 MASS OF RIGHT BREAST, UNSPECIFIED QUADRANT: ICD-10-CM

## 2025-03-25 DIAGNOSIS — N63.20 MASS OF LEFT BREAST, UNSPECIFIED QUADRANT: ICD-10-CM

## 2025-03-25 DIAGNOSIS — Z01.419 WELL WOMAN EXAM WITH ROUTINE GYNECOLOGICAL EXAM: Primary | ICD-10-CM

## 2025-03-25 PROCEDURE — 99396 PREV VISIT EST AGE 40-64: CPT | Performed by: NURSE PRACTITIONER

## 2025-03-25 SDOH — ECONOMIC STABILITY: FOOD INSECURITY: WITHIN THE PAST 12 MONTHS, THE FOOD YOU BOUGHT JUST DIDN'T LAST AND YOU DIDN'T HAVE MONEY TO GET MORE.: PATIENT DECLINED

## 2025-03-25 SDOH — ECONOMIC STABILITY: FOOD INSECURITY: WITHIN THE PAST 12 MONTHS, YOU WORRIED THAT YOUR FOOD WOULD RUN OUT BEFORE YOU GOT MONEY TO BUY MORE.: PATIENT DECLINED

## 2025-03-25 ASSESSMENT — ENCOUNTER SYMPTOMS
SHORTNESS OF BREATH: 0
COLOR CHANGE: 0
NAUSEA: 0
COUGH: 0
VOMITING: 0

## 2025-03-25 ASSESSMENT — PATIENT HEALTH QUESTIONNAIRE - PHQ9: DEPRESSION UNABLE TO ASSESS: PT REFUSES

## 2025-03-25 NOTE — PROGRESS NOTES
Kelly S Kayser is a 45 y.o.  here for her annual exam.  The patient was seen and examined.The patients past medical, surgical, social and family history were reviewed.  Current medications and allergies were reviewed, and documented in the chart.      Has had 3 children- 1 girl and 2 boys. She is .  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      Exercise Yes  Diet Yes  Tobacco abuse No     Last PAP: 2023- negative hx of abnormal PAP yes - years ago, states repeat was normal  Family hx uterine or ovarian cancer-denies,   Last mammogram-2024-negative  , Family hx of breast cancer -denies  family hx colon cancer -denies  Had endoscopic and colonoscopy in 2024 found colon mass and positive colon cancer metastasized to liver.  She has followed with hematology oncology surgeon and has had chemotherapy she has planned for follow-up imaging and labs in 2025     Sexually active: yes - with , multiple partners: No, Dyspareunia: No, Vaginal discharge: no,  UTI symptoms: no, voiding difficulties: no, bowels regular:Yes bloating:no        Menstrual history:  Menarche age- 12, , cycle every  month ,  lasts 4 days.  She states menses had originally been heavier right after chemotherapy but now they have regulated again.  She had followed with Dr. Maya for possible ovarian cyst/lesion but ultrasound was negative for left ovarian lesion/cyst she had follow-up imaging after that for CT and not noted on there either.  Continuing to monitor.  Birth control: vasectomy   LMP: 3/20/25    OB History    Para Term  AB Living   4 3 3  1 3   SAB IAB Ectopic Molar Multiple Live Births   1     3      # Outcome Date GA Lbr Ollie/2nd Weight Sex Type Anes PTL Lv   4 Term            3 Term 2007     CS-LTranv   DEEPAK   2 SAB 2006           1 Term                Vitals:    25 1301   BP: 128/86   BP Site: Left Upper Arm   Patient Position: Sitting   BP Cuff Size: Medium Adult   Weight:

## 2025-04-07 LAB — CYTOLOGY REPORT: NORMAL

## 2025-04-08 ENCOUNTER — RESULTS FOLLOW-UP (OUTPATIENT)
Dept: OBGYN CLINIC | Age: 45
End: 2025-04-08

## 2025-05-01 ENCOUNTER — HOSPITAL ENCOUNTER (OUTPATIENT)
Dept: WOMENS IMAGING | Age: 45
Discharge: HOME | End: 2025-05-03
Payer: COMMERCIAL

## 2025-05-01 DIAGNOSIS — N63.20 MASS OF LEFT BREAST, UNSPECIFIED QUADRANT: ICD-10-CM

## 2025-05-01 DIAGNOSIS — N63.10 MASS OF RIGHT BREAST, UNSPECIFIED QUADRANT: ICD-10-CM

## 2025-05-01 PROCEDURE — G0279 TOMOSYNTHESIS, MAMMO: HCPCS

## 2025-05-01 PROCEDURE — 76642 ULTRASOUND BREAST LIMITED: CPT

## 2025-05-07 ENCOUNTER — RESULTS FOLLOW-UP (OUTPATIENT)
Dept: OBGYN CLINIC | Age: 45
End: 2025-05-07

## 2025-05-09 ENCOUNTER — RESULTS FOLLOW-UP (OUTPATIENT)
Dept: OBGYN CLINIC | Age: 45
End: 2025-05-09

## (undated) DEVICE — MHPB HAND AND FOOT PACK: Brand: MEDLINE INDUSTRIES, INC.

## (undated) DEVICE — SOLUTION SURG PREP ANTIMICROBIAL 4 OZ SKIN WND EXIDINE

## (undated) DEVICE — PREMIUM DRY TRAY LF: Brand: MEDLINE INDUSTRIES, INC.

## (undated) DEVICE — GLOVE ORANGE PI 7 1/2   MSG9075

## (undated) DEVICE — SUTURE MCRYL SZ 3 0 L18IN ABSRB UD PS 2 3 8 CIR REV CUT NDL MCP497G

## (undated) DEVICE — ELECTRODE PT RET AD L9FT HI MOIST COND ADH HYDRGEL CORDED

## (undated) DEVICE — STRIP,CLOSURE,WOUND,MEDI-STRIP,1/2X4: Brand: MEDLINE

## (undated) DEVICE — BLADE ES ELASTOMERIC COAT INSUL DURABLE BEND UPTO 90DEG

## (undated) DEVICE — GLOVE ORANGE PI 7   MSG9070

## (undated) DEVICE — STANDARD HYPODERMIC NEEDLE,POLYPROPYLENE HUB: Brand: MONOJECT

## (undated) DEVICE — SOLUTION IV IRRIG POUR BRL 0.9% SODIUM CHL 2F7124